# Patient Record
Sex: FEMALE | Race: WHITE | NOT HISPANIC OR LATINO | Employment: FULL TIME | URBAN - METROPOLITAN AREA
[De-identification: names, ages, dates, MRNs, and addresses within clinical notes are randomized per-mention and may not be internally consistent; named-entity substitution may affect disease eponyms.]

---

## 2019-06-25 ENCOUNTER — OFFICE VISIT CONVERTED (OUTPATIENT)
Dept: FAMILY MEDICINE CLINIC | Facility: CLINIC | Age: 44
End: 2019-06-25
Attending: NURSE PRACTITIONER

## 2019-06-25 ENCOUNTER — HOSPITAL ENCOUNTER (OUTPATIENT)
Dept: FAMILY MEDICINE CLINIC | Facility: CLINIC | Age: 44
Discharge: HOME | End: 2019-06-25
Attending: NURSE PRACTITIONER

## 2019-06-25 ENCOUNTER — HOSPITAL ENCOUNTER (OUTPATIENT)
Dept: GENERAL RADIOLOGY | Facility: HOSPITAL | Age: 44
Discharge: HOME | End: 2019-06-25
Attending: NURSE PRACTITIONER

## 2019-06-25 ENCOUNTER — HOSPITAL ENCOUNTER (OUTPATIENT)
Dept: LAB | Facility: HOSPITAL | Age: 44
Discharge: HOME | End: 2019-06-25
Attending: NURSE PRACTITIONER

## 2019-06-25 ENCOUNTER — CONVERSION ENCOUNTER (OUTPATIENT)
Dept: FAMILY MEDICINE CLINIC | Facility: CLINIC | Age: 44
End: 2019-06-25

## 2019-06-25 LAB
25(OH)D3 SERPL-MCNC: 39.3 NG/ML (ref 30–100)
ALBUMIN SERPL-MCNC: 4.1 G/DL (ref 3.5–5)
ALBUMIN/GLOB SERPL: 1.2 {RATIO} (ref 1.4–2.6)
ALP SERPL-CCNC: 70 U/L (ref 42–98)
ALT SERPL-CCNC: 13 U/L (ref 10–40)
AMYLASE SERPL-CCNC: 28 U/L (ref 30–110)
ANION GAP SERPL CALC-SCNC: 15 MMOL/L (ref 8–19)
AST SERPL-CCNC: 14 U/L (ref 15–50)
BASOPHILS # BLD AUTO: 0.05 10*3/UL (ref 0–0.2)
BASOPHILS NFR BLD AUTO: 0.6 % (ref 0–3)
BILIRUB SERPL-MCNC: 0.54 MG/DL (ref 0.2–1.3)
BUN SERPL-MCNC: 8 MG/DL (ref 5–25)
BUN/CREAT SERPL: 10 {RATIO} (ref 6–20)
CALCIUM SERPL-MCNC: 9 MG/DL (ref 8.7–10.4)
CHLORIDE SERPL-SCNC: 99 MMOL/L (ref 99–111)
CHOLEST SERPL-MCNC: 169 MG/DL (ref 107–200)
CHOLEST/HDLC SERPL: 3 {RATIO} (ref 3–6)
CONV ABS IMM GRAN: 0.02 10*3/UL (ref 0–0.2)
CONV CO2: 27 MMOL/L (ref 22–32)
CONV IMMATURE GRAN: 0.2 % (ref 0–1.8)
CONV TOTAL PROTEIN: 7.5 G/DL (ref 6.3–8.2)
CREAT UR-MCNC: 0.79 MG/DL (ref 0.5–0.9)
DEPRECATED RDW RBC AUTO: 43.8 FL (ref 36.4–46.3)
EOSINOPHIL # BLD AUTO: 0.03 10*3/UL (ref 0–0.7)
EOSINOPHIL # BLD AUTO: 0.4 % (ref 0–7)
ERYTHROCYTE [DISTWIDTH] IN BLOOD BY AUTOMATED COUNT: 12.4 % (ref 11.7–14.4)
EST. AVERAGE GLUCOSE BLD GHB EST-MCNC: 97 MG/DL
GFR SERPLBLD BASED ON 1.73 SQ M-ARVRAT: >60 ML/MIN/{1.73_M2}
GLOBULIN UR ELPH-MCNC: 3.4 G/DL (ref 2–3.5)
GLUCOSE SERPL-MCNC: 94 MG/DL (ref 65–99)
HBA1C MFR BLD: 13.5 G/DL (ref 12–16)
HBA1C MFR BLD: 5 % (ref 3.5–5.7)
HCT VFR BLD AUTO: 41.7 % (ref 37–47)
HDLC SERPL-MCNC: 57 MG/DL (ref 40–60)
LDLC SERPL CALC-MCNC: 79 MG/DL (ref 70–100)
LIPASE SERPL-CCNC: 44 U/L (ref 5–51)
LYMPHOCYTES # BLD AUTO: 2.2 10*3/UL (ref 1–5)
MCH RBC QN AUTO: 31.1 PG (ref 27–31)
MCHC RBC AUTO-ENTMCNC: 32.4 G/DL (ref 33–37)
MCV RBC AUTO: 96.1 FL (ref 81–99)
MONOCYTES # BLD AUTO: 0.65 10*3/UL (ref 0.2–1.2)
MONOCYTES NFR BLD AUTO: 8 % (ref 3–10)
NEUTROPHILS # BLD AUTO: 5.17 10*3/UL (ref 2–8)
NEUTROPHILS NFR BLD AUTO: 63.7 % (ref 30–85)
NRBC CBCN: 0 % (ref 0–0.7)
OSMOLALITY SERPL CALC.SUM OF ELEC: 282 MOSM/KG (ref 273–304)
PLATELET # BLD AUTO: 266 10*3/UL (ref 130–400)
PMV BLD AUTO: 10.8 FL (ref 9.4–12.3)
POTASSIUM SERPL-SCNC: 3.8 MMOL/L (ref 3.5–5.3)
RBC # BLD AUTO: 4.34 10*6/UL (ref 4.2–5.4)
SODIUM SERPL-SCNC: 137 MMOL/L (ref 135–147)
T4 FREE SERPL-MCNC: 1.2 NG/DL (ref 0.9–1.8)
TRIGL SERPL-MCNC: 164 MG/DL (ref 40–150)
TSH SERPL-ACNC: 2.69 M[IU]/L (ref 0.27–4.2)
VARIANT LYMPHS NFR BLD MANUAL: 27.1 % (ref 20–45)
VLDLC SERPL-MCNC: 33 MG/DL (ref 5–37)
WBC # BLD AUTO: 8.12 10*3/UL (ref 4.8–10.8)

## 2019-06-26 LAB
CONV HEPATITIS B SURFACE AG W CONFIRMATION RE: NEGATIVE
HAV IGM SERPL QL IA: NEGATIVE
HBV CORE IGM SERPL QL IA: NEGATIVE
HCV AB SER DONR QL: <0.1 S/CO RATIO (ref 0–0.9)

## 2019-06-27 LAB
BACTERIA SPEC AEROBE CULT: NORMAL
BACTERIA UR CULT: NORMAL

## 2019-07-10 ENCOUNTER — HOSPITAL ENCOUNTER (OUTPATIENT)
Dept: GENERAL RADIOLOGY | Facility: HOSPITAL | Age: 44
Discharge: HOME | End: 2019-07-10

## 2020-01-06 ENCOUNTER — HOSPITAL ENCOUNTER (OUTPATIENT)
Dept: URGENT CARE | Facility: CLINIC | Age: 45
Discharge: HOME | End: 2020-01-06

## 2020-04-28 ENCOUNTER — OFFICE VISIT CONVERTED (OUTPATIENT)
Dept: OTHER | Facility: HOSPITAL | Age: 45
End: 2020-04-28
Attending: PHYSICIAN ASSISTANT

## 2020-04-28 ENCOUNTER — HOSPITAL ENCOUNTER (OUTPATIENT)
Dept: OTHER | Facility: HOSPITAL | Age: 45
Discharge: HOME | End: 2020-04-28
Attending: PHYSICIAN ASSISTANT

## 2020-04-29 LAB — SARS-COV-2 RNA SPEC QL NAA+PROBE: NOT DETECTED

## 2020-05-08 ENCOUNTER — HOSPITAL ENCOUNTER (OUTPATIENT)
Dept: GENERAL RADIOLOGY | Facility: HOSPITAL | Age: 45
Discharge: HOME | End: 2020-05-08

## 2020-05-08 ENCOUNTER — OFFICE VISIT (OUTPATIENT)
Dept: FAMILY MEDICINE CLINIC | Facility: CLINIC | Age: 45
End: 2020-05-08

## 2020-05-08 VITALS
HEIGHT: 68 IN | WEIGHT: 175.8 LBS | HEART RATE: 88 BPM | BODY MASS INDEX: 26.64 KG/M2 | TEMPERATURE: 98.7 F | RESPIRATION RATE: 14 BRPM | SYSTOLIC BLOOD PRESSURE: 148 MMHG | DIASTOLIC BLOOD PRESSURE: 100 MMHG | OXYGEN SATURATION: 98 %

## 2020-05-08 DIAGNOSIS — R20.2 PARESTHESIA: Primary | ICD-10-CM

## 2020-05-08 DIAGNOSIS — M54.41 ACUTE RIGHT-SIDED LOW BACK PAIN WITH RIGHT-SIDED SCIATICA: ICD-10-CM

## 2020-05-08 PROBLEM — N93.9 ABNORMAL UTERINE AND VAGINAL BLEEDING, UNSPECIFIED: Status: ACTIVE | Noted: 2019-07-24

## 2020-05-08 PROCEDURE — 99203 OFFICE O/P NEW LOW 30 MIN: CPT | Performed by: NURSE PRACTITIONER

## 2020-05-08 RX ORDER — MELATONIN
1000 DAILY
COMMUNITY
End: 2022-07-11

## 2020-05-08 NOTE — PATIENT INSTRUCTIONS
I will call you with your lab results.   Please call with any questions or concerns.   Return in about 2 months (around 7/8/2020) for Annual, Labs.

## 2020-05-08 NOTE — PROGRESS NOTES
"Olga Pyle is a 44 y.o. female.   .     History of Present Illness   Patient here for numbness in right arm hand and right foot. Patient also complains of lower back pain.  Patients states having joint pain last week and the numbness followed she also states burning as well.  She states that her numbness is generally on the right side, but she does have minimal numbess in her left hand and leg. She does report an injury \"last year\" in cross-fit in her upper right shoulder. She states coldness in hand and foot. She take ibuprofen for her pain which she states gave some relief.     The following portions of the patient's history were reviewed and updated as appropriate: allergies, current medications, past family history, past medical history, past social history, past surgical history and problem list.    Review of Systems   Constitutional: Negative for chills, fatigue and fever.   Respiratory: Negative for cough and shortness of breath.    Cardiovascular: Negative for chest pain, palpitations and leg swelling.   Musculoskeletal: Positive for arthralgias, back pain, myalgias and neck stiffness. Negative for gait problem, joint swelling and neck pain.        Joint pain in hands bilaterally. Ankles bilaterally.    Skin: Negative for dry skin.   Neurological: Positive for numbness (Left arm and leg intermittently). Negative for dizziness, weakness and headache.        Tingling arm hand and leg and foot with burning   Psychiatric/Behavioral: Negative for sleep disturbance, suicidal ideas and depressed mood. The patient is nervous/anxious.        Objective   Physical Exam   Constitutional: She is oriented to person, place, and time. She appears well-developed and well-nourished.   HENT:   Head: Normocephalic and atraumatic.   Eyes: Pupils are equal, round, and reactive to light. Conjunctivae and EOM are normal.   Neck: Normal range of motion and full passive range of motion without pain. Neck supple. No " thyromegaly present.   Cardiovascular: Normal rate, regular rhythm, normal heart sounds and intact distal pulses.   No murmur heard.  Pulses:       Radial pulses are 2+ on the right side, and 2+ on the left side.        Dorsalis pedis pulses are 2+ on the right side, and 2+ on the left side.        Posterior tibial pulses are 1+ on the right side, and 1+ on the left side.   Pulmonary/Chest: Effort normal and breath sounds normal.   Musculoskeletal: Normal range of motion. She exhibits no edema or deformity.        Right shoulder: Normal. She exhibits normal range of motion, no tenderness, no bony tenderness, no swelling and no effusion.        Cervical back: Normal.        Thoracic back: Normal.        Lumbar back: Normal. She exhibits normal range of motion, no tenderness, no bony tenderness, no swelling and no edema.   Positive right leg raise with exam.    Lymphadenopathy:     She has no cervical adenopathy.   Neurological: She is alert and oriented to person, place, and time. No cranial nerve deficit. Coordination normal.   Skin: Skin is warm and dry.   Psychiatric: She has a normal mood and affect. Her behavior is normal. Judgment and thought content normal.   Nursing note and vitals reviewed.      Vitals:    05/08/20 0911   BP: 148/100   Pulse: 88   Resp: 14   Temp: 98.7 °F (37.1 °C)   SpO2: 98%     Body mass index is 26.73 kg/m².    Procedures    Assessment/Plan   Problems Addressed this Visit     None      Visit Diagnoses     Paresthesia    -  Primary    Relevant Orders    Comprehensive Metabolic Panel    [facility]    Rheumatoid Factor    Vitamin D 25 Hydroxy    Vitamin B12    Folate    TSH    CBC (No Diff)    Acute right-sided low back pain with right-sided sciatica        Relevant Orders    XR Spine Lumbar 2 or 3 View        Return in about 2 months (around 7/8/2020) for Annual, Labs.            Patient Instructions   I will call you with your lab results.   Please call with any questions or concerns.   Return  in about 2 months (around 7/8/2020) for Annual, Labs.

## 2020-05-09 LAB
25(OH)D3+25(OH)D2 SERPL-MCNC: 32.4 NG/ML (ref 30–100)
ALBUMIN SERPL-MCNC: 4.5 G/DL (ref 3.5–5.2)
ALBUMIN/GLOB SERPL: 1.5 G/DL
ALP SERPL-CCNC: 64 U/L (ref 39–117)
ALT SERPL-CCNC: 7 U/L (ref 1–33)
AST SERPL-CCNC: 8 U/L (ref 1–32)
BILIRUB SERPL-MCNC: 0.5 MG/DL (ref 0.2–1.2)
BUN SERPL-MCNC: 7 MG/DL (ref 6–20)
BUN/CREAT SERPL: 8.2 (ref 7–25)
CALCIUM SERPL-MCNC: 9.6 MG/DL (ref 8.6–10.5)
CHLORIDE SERPL-SCNC: 102 MMOL/L (ref 98–107)
CO2 SERPL-SCNC: 25.8 MMOL/L (ref 22–29)
CREAT SERPL-MCNC: 0.85 MG/DL (ref 0.57–1)
ERYTHROCYTE [DISTWIDTH] IN BLOOD BY AUTOMATED COUNT: 12 % (ref 12.3–15.4)
FOLATE SERPL-MCNC: 19 NG/ML (ref 4.78–24.2)
GLOBULIN SER CALC-MCNC: 3 GM/DL
GLUCOSE SERPL-MCNC: 119 MG/DL (ref 65–99)
HCT VFR BLD AUTO: 40.1 % (ref 34–46.6)
HGB BLD-MCNC: 13.9 G/DL (ref 12–15.9)
MCH RBC QN AUTO: 31.6 PG (ref 26.6–33)
MCHC RBC AUTO-ENTMCNC: 34.7 G/DL (ref 31.5–35.7)
MCV RBC AUTO: 91.1 FL (ref 79–97)
PLATELET # BLD AUTO: 281 10*3/MM3 (ref 140–450)
POTASSIUM SERPL-SCNC: 4.2 MMOL/L (ref 3.5–5.2)
PROT SERPL-MCNC: 7.5 G/DL (ref 6–8.5)
RBC # BLD AUTO: 4.4 10*6/MM3 (ref 3.77–5.28)
RHEUMATOID FACT SERPL-ACNC: <10 IU/ML (ref 0–13.9)
SODIUM SERPL-SCNC: 138 MMOL/L (ref 136–145)
TSH SERPL DL<=0.005 MIU/L-ACNC: 2.69 UIU/ML (ref 0.27–4.2)
VIT B12 SERPL-MCNC: 384 PG/ML (ref 211–946)
WBC # BLD AUTO: 6.9 10*3/MM3 (ref 3.4–10.8)

## 2020-05-11 LAB — ANA SER QL: NEGATIVE

## 2020-06-08 ENCOUNTER — OFFICE VISIT (OUTPATIENT)
Dept: FAMILY MEDICINE CLINIC | Facility: CLINIC | Age: 45
End: 2020-06-08

## 2020-06-08 VITALS
SYSTOLIC BLOOD PRESSURE: 130 MMHG | RESPIRATION RATE: 14 BRPM | OXYGEN SATURATION: 98 % | DIASTOLIC BLOOD PRESSURE: 80 MMHG | HEART RATE: 87 BPM | BODY MASS INDEX: 25.7 KG/M2 | WEIGHT: 169.6 LBS | TEMPERATURE: 98.6 F | HEIGHT: 68 IN

## 2020-06-08 DIAGNOSIS — M25.50 ARTHRALGIA, UNSPECIFIED JOINT: Primary | ICD-10-CM

## 2020-06-08 DIAGNOSIS — Z72.51 UNPROTECTED SEX: ICD-10-CM

## 2020-06-08 DIAGNOSIS — F41.9 ANXIETY: ICD-10-CM

## 2020-06-08 PROCEDURE — 99213 OFFICE O/P EST LOW 20 MIN: CPT | Performed by: NURSE PRACTITIONER

## 2020-06-08 NOTE — PATIENT INSTRUCTIONS
May take ibuprofen over the counter as directed for joint pain.  I will call you with your lab results.   Please call with any questions or concerns.

## 2020-06-08 NOTE — PROGRESS NOTES
"Olga Pyle is a 44 y.o. female.     History of Present Illness   Patient here for a follow up on all over joint pain that she states \"moves\" all over.  She states that when she takes ibuprofen that it helps. Pt was seen last month for this and all labs were normal.      Pt is also being seen for anxiety, ongoing.  She states that her anxiety has been \"worse\" with pandemic. Pt believes that her joint pain is related to her anxiety and states that \"she read this on google\".  Pt is not taking any medications for her anxiety. She does not wish to take any medications at this time or seek counseling.     Pt is also being seen for std screening. She states that she had unprotected sex in March 2019 and is concerned that she might have an STD, despite being checked in May of 2018 and all tests were negative. Patient denies any other sexual encounters since this time that were unprotected.     The following portions of the patient's history were reviewed and updated as appropriate: allergies, current medications, past family history, past medical history, past social history, past surgical history and problem list.    Review of Systems   Constitutional: Negative.  Negative for chills, fatigue and fever.   Respiratory: Negative.  Negative for cough, chest tightness, shortness of breath and wheezing.    Cardiovascular: Negative.  Negative for chest pain, palpitations and leg swelling.   Gastrointestinal: Negative.    Musculoskeletal: Positive for arthralgias. Negative for back pain, joint swelling and neck pain.        All over joint pain   Neurological: Positive for headache. Negative for dizziness.   Hematological: Negative.    Psychiatric/Behavioral: Negative for sleep disturbance. The patient is nervous/anxious.        Objective   Physical Exam   Constitutional: She is oriented to person, place, and time. She appears well-developed and well-nourished.   HENT:   Head: Normocephalic and atraumatic.   Eyes: " Pupils are equal, round, and reactive to light. Conjunctivae and EOM are normal.   Cardiovascular: Normal rate, regular rhythm, normal heart sounds and intact distal pulses.   No murmur heard.  Pulmonary/Chest: Effort normal and breath sounds normal.   Abdominal: Soft. Bowel sounds are normal. There is no tenderness.   Musculoskeletal: Normal range of motion. She exhibits no edema, tenderness or deformity.   Neurological: She is alert and oriented to person, place, and time.   Psychiatric: She has a normal mood and affect. Her behavior is normal. Judgment and thought content normal.   Nursing note and vitals reviewed.      Vitals:    06/08/20 1311   BP: 130/80   Pulse: 87   Resp: 14   Temp: 98.6 °F (37 °C)   SpO2: 98%     Body mass index is 25.79 kg/m².    Procedures    Assessment/Plan   Problems Addressed this Visit     None      Visit Diagnoses     Arthralgia, unspecified joint    -  Primary  Ibuprofen OTC as directed    Unprotected sex        Relevant Orders    HIV-1 / O / 2 Ag / Antibody 4th Generation        Anxiety  Recommended SSRI, Lexapro specifically. Pt refused.         Return if symptoms worsen or fail to improve.       Answers for HPI/ROS submitted by the patient on 6/6/2020   Neurologic complaint  What is the primary reason for your visit?: Neurological Problem    Patient Instructions   May take ibuprofen over the counter as directed for joint pain.  I will call you with your lab results.   Please call with any questions or concerns.

## 2020-06-09 LAB — HIV 1+2 AB+HIV1 P24 AG SERPL QL IA: NON REACTIVE

## 2020-08-06 DIAGNOSIS — M54.41 ACUTE RIGHT-SIDED LOW BACK PAIN WITH RIGHT-SIDED SCIATICA: ICD-10-CM

## 2021-05-10 NOTE — H&P
History and Physical      Patient Name: Britany Pyle   Patient ID: [id number]   Sex: Female   YOB: 1975        Visit Date: 2020    Provider: Blanca Wilder PA-C   [facility]   Location Address: [address] [place] [address]  [id number]   Location Phone: [phone]          Chief Complaint  · Evaluation for Respiratory Virus      History Of Present Illness  Britany Pyle is a 44 year old /White female who presents today to the clinic for evaluation of a respiratory virus.   Date of Onset: 2020   What Symptoms: chills, diarrhea, muscle ache, and shortness of breath   Quality of Symptoms: Patient has had symptoms since Saturday with body aches diarrhea shortness of breath lightheadedness dizziness tingling in right hand and chills. She denies fever she denies cough. 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   Anything make it worse or better: Nothing has helped. Patient was told by hotline to come here for COVID -19   Severity of Symptoms: moderately bothersome   Any known Exposure to COVID-19: no known exposure       Past Medical History  Broken Bones; Migraine Headaches         Past Surgical History  *Denies any surgical procedures         Allergy List  NO KNOWN DRUG ALLERGIES       Allergies Reconciled  Family Medical History  *Non Contributory         Reproductive History   3 Para 3 0 0 0       Social History  Tobacco (Never)         Immunizations  Name Date Admin   Tdap          Review of Systems  · Constitutional  o Admits  o : chills , body aches  o Denies  o : fatigue, fever, weight gain, weight loss  · Respiratory  o Admits  o : shortness of breath  o Denies  o : cough, asthma  · Gastrointestinal  o Admits  o : diarrhea  o Denies  o : nausea, vomiting, constipation, abdominal pain  · Integument  o Denies  o : rash  · Neurologic  o Denies  o :  headache      Vitals  Date Time BP Position Site L\R Cuff Size HR RR TEMP (F) WT  HT  BMI kg/m2 BSA m2 O2 Sat        04/28/2020 08:55 AM      115 - R  98.5     95 %          Physical Examination  · Constitutional  o Appearance  o : no acute distress, well-nourished  · Head and Face  o Head  o :   § Inspection  § : atraumatic, normocephalic  · Eyes  o Eyes  o : extraocular movements intact, no scleral icterus, no conjunctival injection  · Ears, Nose, Mouth and Throat  o Ears  o :   § External Ears  § : normal  § Otoscopic Examination  § : normal tympanic membrane exam  o Nose  o :   § Intranasal Exam  § : sinuses nontender to palpation  o Oral Cavity  o :   § Oral Mucosa  § : moist mucous membranes  o Throat  o :   § Oropharynx  § : normal tonsils, normal oropharynx  · Respiratory  o Respiratory Effort  o : breathing comfortably, symmetric chest rise  o Auscultation of Lungs  o : clear to asculatation bilaterally, no wheezes, rales, or rhonchii  · Cardiovascular  o Heart  o :   § Auscultation of Heart  § : regular rate and rhythm, no murmurs, rubs, or gallops  o Peripheral Vascular System  o :   § Extremities  § : no edema  · Lymphatic  o Neck  o : no lymphadenopathy present  o Supraclavicular Nodes  o : no supraclavicular nodes  · Skin and Subcutaneous Tissue  o General Inspection  o : normal inspection  · Neurologic  o Mental Status Examination  o :   § Orientation  § : grossly oriented to person, place and time  o Gait and Station  o :   § Gait Screening  § : normal gait  · Psychiatric  o General  o : normal mood and affect          Results  · In-Office Procedures  o Lab procedure  § Rapid group A Streptococcus screen (31874)   § Beta Strep Gp A Culture: Negative   § Internal Control Verified?: Yes       Assessment  · Body aches     780.96/R52  · Chills     780.64/R68.83  · Diarrhea     787.91/R19.7  · Shortness of breath     786.05/R06.02      Plan  · Orders  o [facility] NCOV2 (send-out) (22939) -  780.96/R52, 780.64/R68.83, 787.91/R19.7, 786.05/R06.02 - 04/28/2020  · Medications  o Medications have been Reconciled  o Transition of Care or Provider Policy  · Instructions  o Plan of Care:   o Patient instructed to seek medical attention urgently for new or worsening symptoms.  o Patient was educated on their diagnosis, treatment, and medications today.   o Recommend self monitoring. Instructions given.   o Stay home until without fever for 72 hours without using fever-reducing medications and other symptoms are gone.  o Recommends over the counter medications for symptom management.  o Patient was swabbed for COVID-19. Patient was sent home with COVID-19 handout information. Patient was informed to self isolate. Patient was given a 3 day work note given the time it takes for lab results to return and for patient to be notified. Further days off if required are explained in COVID test handout given to patient. Patient will be notified of test results. Patient was encouraged to stay hydrated. Patient was educated to use Tylenol if able, as directed. If not, patient is instructed to use fever reducing OTC meds as directed. Patient can use OTC medications as directed for symptoms.   o Strep was negative  · Disposition  o Call or Return if symptoms worsen or persist.            Electronically Signed by: Blanca Wilder PA-C -Author on April 28, 2020 09:30:48 AM

## 2021-05-15 VITALS — OXYGEN SATURATION: 95 % | TEMPERATURE: 98.5 F | HEART RATE: 115 BPM

## 2021-05-15 VITALS
OXYGEN SATURATION: 98 % | TEMPERATURE: 97.9 F | DIASTOLIC BLOOD PRESSURE: 80 MMHG | HEIGHT: 68 IN | RESPIRATION RATE: 26 BRPM | HEART RATE: 74 BPM | BODY MASS INDEX: 26 KG/M2 | SYSTOLIC BLOOD PRESSURE: 131 MMHG | WEIGHT: 171.56 LBS

## 2021-09-10 ENCOUNTER — LAB (OUTPATIENT)
Dept: LAB | Facility: HOSPITAL | Age: 46
End: 2021-09-10

## 2021-09-10 ENCOUNTER — OFFICE VISIT (OUTPATIENT)
Dept: FAMILY MEDICINE CLINIC | Facility: CLINIC | Age: 46
End: 2021-09-10

## 2021-09-10 VITALS
WEIGHT: 176.4 LBS | TEMPERATURE: 97.7 F | HEIGHT: 68 IN | BODY MASS INDEX: 26.73 KG/M2 | OXYGEN SATURATION: 99 % | DIASTOLIC BLOOD PRESSURE: 92 MMHG | HEART RATE: 102 BPM | SYSTOLIC BLOOD PRESSURE: 147 MMHG

## 2021-09-10 DIAGNOSIS — Z00.00 ANNUAL PHYSICAL EXAM: ICD-10-CM

## 2021-09-10 DIAGNOSIS — Z32.00 POSSIBLE PREGNANCY: Primary | ICD-10-CM

## 2021-09-10 LAB
B-HCG UR QL: NEGATIVE
INTERNAL NEGATIVE CONTROL: NORMAL
INTERNAL POSITIVE CONTROL: NORMAL
Lab: NORMAL

## 2021-09-10 PROCEDURE — 99203 OFFICE O/P NEW LOW 30 MIN: CPT | Performed by: FAMILY MEDICINE

## 2021-09-10 PROCEDURE — 81025 URINE PREGNANCY TEST: CPT | Performed by: FAMILY MEDICINE

## 2021-09-10 RX ORDER — ETONOGESTREL AND ETHINYL ESTRADIOL 11.7; 2.7 MG/1; MG/1
1 INSERT, EXTENDED RELEASE VAGINAL
COMMUNITY
Start: 2021-09-07 | End: 2023-02-03

## 2021-09-10 NOTE — PROGRESS NOTES
Chief Complaint   Patient presents with   • Establish Care        Subjective     Britany Pyle  has a past medical history of Anxiety, Broken bones (2009), and Migraine headache.    Establish care-she states the last couple years she is bounced around and not had a dedicated full-time PCP.  She otherwise is a rather healthy young lady.    Elevated blood pressure-she has once before her blood pressure was elevated but then it self resolved.  Currently she has some personal stress that may have it elevated.      PHQ-2 Depression Screening  Little interest or pleasure in doing things? 0   Feeling down, depressed, or hopeless? 0   PHQ-2 Total Score 0   PHQ-9 Depression Screening  Little interest or pleasure in doing things? 0   Feeling down, depressed, or hopeless? 0   Trouble falling or staying asleep, or sleeping too much?     Feeling tired or having little energy?     Poor appetite or overeating?     Feeling bad about yourself - or that you are a failure or have let yourself or your family down?     Trouble concentrating on things, such as reading the newspaper or watching television?     Moving or speaking so slowly that other people could have noticed? Or the opposite - being so fidgety or restless that you have been moving around a lot more than usual?     Thoughts that you would be better off dead, or of hurting yourself in some way?     PHQ-9 Total Score 0   If you checked off any problems, how difficult have these problems made it for you to do your work, take care of things at home, or get along with other people?       No Known Allergies    Prior to Admission medications    Medication Sig Start Date End Date Taking? Authorizing Provider   cholecalciferol (VITAMIN D3) 25 MCG (1000 UT) tablet Take 1,000 Units by mouth Daily.   Yes Provider, MD TARIK Johnson PO Take  by mouth.   Yes Provider, MD Elizabeth   etonogestrel-ethinyl estradiol (NUVARING) 0.12-0.015 MG/24HR vaginal ring Insert 1 each  "into the vagina. 9/7/21 9/7/22 Yes ProviderElizabeth MD   Multiple Vitamins-Minerals (WOMENS MULTIVITAMIN PO) Take  by mouth.   Yes Provider, MD Elizabeth        Patient Active Problem List   Diagnosis   • Abnormal uterine and vaginal bleeding, unspecified   • Uterovaginal prolapse, incomplete   • Annual physical exam   • Possible pregnancy        Past Surgical History:   Procedure Laterality Date   • LEG SURGERY      right leg       Social History     Socioeconomic History   • Marital status:      Spouse name: Not on file   • Number of children: Not on file   • Years of education: Not on file   • Highest education level: Not on file   Tobacco Use   • Smoking status: Never Smoker   • Smokeless tobacco: Never Used   Vaping Use   • Vaping Use: Never used   Substance and Sexual Activity   • Alcohol use: Yes     Comment: occasionally   • Drug use: Never   • Sexual activity: Not Currently       Family History   Problem Relation Age of Onset   • Hypertension Mother        Family history, surgical history, past medical history, Allergies and med's reviewed with patient today and updated in [employer] EMR.     ROS:  Review of Systems   Constitutional: Negative for appetite change, chills and fatigue.   HENT: Negative for congestion.    Eyes: Negative for blurred vision and visual disturbance.   Respiratory: Negative for cough, chest tightness, shortness of breath and wheezing.    Cardiovascular: Negative for chest pain and palpitations.   Gastrointestinal: Negative for abdominal pain, constipation and diarrhea.   Neurological: Positive for headache.   Psychiatric/Behavioral: Negative for depressed mood. The patient is not nervous/anxious.        OBJECTIVE:  Vitals:    09/10/21 1423   BP: 147/92   BP Location: Right arm   Patient Position: Sitting   Pulse: 102   Temp: 97.7 °F (36.5 °C)   SpO2: 99%   Weight: 80 kg (176 lb 6.4 oz)   Height: 172.7 cm (68\")     No exam data present   Body mass index is 26.82 kg/m².  No " LMP recorded.    Physical Exam  Vitals and nursing note reviewed.   Constitutional:       General: She is not in acute distress.     Appearance: Normal appearance. She is normal weight.   HENT:      Head: Normocephalic.      Right Ear: Tympanic membrane, ear canal and external ear normal.      Left Ear: Tympanic membrane, ear canal and external ear normal.      Nose: Nose normal.      Mouth/Throat:      Mouth: Mucous membranes are moist.      Pharynx: Oropharynx is clear.   Eyes:      General: No scleral icterus.     Conjunctiva/sclera: Conjunctivae normal.      Pupils: Pupils are equal, round, and reactive to light.   Cardiovascular:      Rate and Rhythm: Normal rate and regular rhythm.      Pulses: Normal pulses.      Heart sounds: Normal heart sounds. No murmur heard.     Pulmonary:      Effort: Pulmonary effort is normal.      Breath sounds: Normal breath sounds. No wheezing, rhonchi or rales.   Abdominal:      General: Abdomen is flat. Bowel sounds are normal.      Palpations: Abdomen is soft. There is no mass.      Tenderness: There is no abdominal tenderness. There is no right CVA tenderness.   Musculoskeletal:      Cervical back: No rigidity or tenderness.   Lymphadenopathy:      Cervical: No cervical adenopathy.   Skin:     General: Skin is warm and dry.      Coloration: Skin is not jaundiced.      Findings: No rash.   Neurological:      General: No focal deficit present.      Mental Status: She is alert and oriented to person, place, and time.      Gait: Gait normal.   Psychiatric:         Mood and Affect: Mood normal.         Thought Content: Thought content normal.         Judgment: Judgment normal.         Procedures    No visits with results within 30 Day(s) from this visit.   Latest known visit with results is:   Office Visit on 06/08/2020   Component Date Value Ref Range Status   • HIV Screen 4th Gen w/RFX (Referenc* 06/08/2020 Non Reactive  Non Reactive Final       ASSESSMENT/ PLAN:    Diagnoses and  all orders for this visit:    1. Possible pregnancy (Primary)  Assessment & Plan:  We will check a urine pregnancy test to rule this out.    Orders:  -     POCT pregnancy, urine    2. Annual physical exam  Assessment & Plan:  She is an otherwise healthy young lady.  We will discuss some routine labs.    Orders:  -     Comprehensive Metabolic Panel  -     CBC & Differential  -     TSH  -     Lipid Panel      Orders Placed Today:     No orders of the defined types were placed in this encounter.       Management Plan:     An After Visit Summary was printed and given to the patient at discharge.    Follow-up: No follow-ups on file.    Leobardo Trammell DO 9/10/2021 14:53 EDT  This note was electronically signed.

## 2021-09-11 LAB
ALBUMIN SERPL-MCNC: 4.2 G/DL (ref 3.5–5.2)
ALBUMIN/GLOB SERPL: 1.3 G/DL
ALP SERPL-CCNC: 65 U/L (ref 39–117)
ALT SERPL W P-5'-P-CCNC: 14 U/L (ref 1–33)
ANION GAP SERPL CALCULATED.3IONS-SCNC: 11.6 MMOL/L (ref 5–15)
AST SERPL-CCNC: 15 U/L (ref 1–32)
BASOPHILS # BLD AUTO: 0.07 10*3/MM3 (ref 0–0.2)
BASOPHILS NFR BLD AUTO: 0.8 % (ref 0–1.5)
BILIRUB SERPL-MCNC: 0.2 MG/DL (ref 0–1.2)
BUN SERPL-MCNC: 8 MG/DL (ref 6–20)
BUN/CREAT SERPL: 10 (ref 7–25)
CALCIUM SPEC-SCNC: 9.4 MG/DL (ref 8.6–10.5)
CHLORIDE SERPL-SCNC: 108 MMOL/L (ref 98–107)
CHOLEST SERPL-MCNC: 137 MG/DL (ref 0–200)
CO2 SERPL-SCNC: 21.4 MMOL/L (ref 22–29)
CREAT SERPL-MCNC: 0.8 MG/DL (ref 0.57–1)
DEPRECATED RDW RBC AUTO: 39.4 FL (ref 37–54)
EOSINOPHIL # BLD AUTO: 0.12 10*3/MM3 (ref 0–0.4)
EOSINOPHIL NFR BLD AUTO: 1.4 % (ref 0.3–6.2)
ERYTHROCYTE [DISTWIDTH] IN BLOOD BY AUTOMATED COUNT: 11.9 % (ref 12.3–15.4)
GFR SERPL CREATININE-BSD FRML MDRD: 78 ML/MIN/1.73
GLOBULIN UR ELPH-MCNC: 3.2 GM/DL
GLUCOSE SERPL-MCNC: 117 MG/DL (ref 65–99)
HCT VFR BLD AUTO: 39 % (ref 34–46.6)
HDLC SERPL-MCNC: 29 MG/DL (ref 40–60)
HGB BLD-MCNC: 13.1 G/DL (ref 12–15.9)
IMM GRANULOCYTES # BLD AUTO: 0.03 10*3/MM3 (ref 0–0.05)
IMM GRANULOCYTES NFR BLD AUTO: 0.4 % (ref 0–0.5)
LDLC SERPL CALC-MCNC: 90 MG/DL (ref 0–100)
LDLC/HDLC SERPL: 3.06 {RATIO}
LYMPHOCYTES # BLD AUTO: 3.25 10*3/MM3 (ref 0.7–3.1)
LYMPHOCYTES NFR BLD AUTO: 38 % (ref 19.6–45.3)
MCH RBC QN AUTO: 30.4 PG (ref 26.6–33)
MCHC RBC AUTO-ENTMCNC: 33.6 G/DL (ref 31.5–35.7)
MCV RBC AUTO: 90.5 FL (ref 79–97)
MONOCYTES # BLD AUTO: 0.65 10*3/MM3 (ref 0.1–0.9)
MONOCYTES NFR BLD AUTO: 7.6 % (ref 5–12)
NEUTROPHILS NFR BLD AUTO: 4.44 10*3/MM3 (ref 1.7–7)
NEUTROPHILS NFR BLD AUTO: 51.8 % (ref 42.7–76)
NRBC BLD AUTO-RTO: 0 /100 WBC (ref 0–0.2)
PLATELET # BLD AUTO: 346 10*3/MM3 (ref 140–450)
PMV BLD AUTO: 10.6 FL (ref 6–12)
POTASSIUM SERPL-SCNC: 3.9 MMOL/L (ref 3.5–5.2)
PROT SERPL-MCNC: 7.4 G/DL (ref 6–8.5)
RBC # BLD AUTO: 4.31 10*6/MM3 (ref 3.77–5.28)
SODIUM SERPL-SCNC: 141 MMOL/L (ref 136–145)
TRIGL SERPL-MCNC: 97 MG/DL (ref 0–150)
TSH SERPL DL<=0.05 MIU/L-ACNC: 2.31 UIU/ML (ref 0.27–4.2)
VLDLC SERPL-MCNC: 18 MG/DL (ref 5–40)
WBC # BLD AUTO: 8.56 10*3/MM3 (ref 3.4–10.8)

## 2021-09-11 PROCEDURE — 80053 COMPREHEN METABOLIC PANEL: CPT | Performed by: FAMILY MEDICINE

## 2021-09-11 PROCEDURE — 84443 ASSAY THYROID STIM HORMONE: CPT | Performed by: FAMILY MEDICINE

## 2021-09-11 PROCEDURE — 85025 COMPLETE CBC W/AUTO DIFF WBC: CPT | Performed by: FAMILY MEDICINE

## 2021-09-11 PROCEDURE — 80061 LIPID PANEL: CPT | Performed by: FAMILY MEDICINE

## 2021-12-03 ENCOUNTER — OFFICE VISIT (OUTPATIENT)
Dept: FAMILY MEDICINE CLINIC | Facility: CLINIC | Age: 46
End: 2021-12-03

## 2021-12-03 VITALS
OXYGEN SATURATION: 98 % | BODY MASS INDEX: 26.98 KG/M2 | TEMPERATURE: 98.9 F | DIASTOLIC BLOOD PRESSURE: 113 MMHG | WEIGHT: 178 LBS | SYSTOLIC BLOOD PRESSURE: 165 MMHG | HEIGHT: 68 IN | HEART RATE: 90 BPM

## 2021-12-03 DIAGNOSIS — N64.4 BREAST PAIN, LEFT: ICD-10-CM

## 2021-12-03 DIAGNOSIS — H69.83 EUSTACHIAN TUBE DYSFUNCTION, BILATERAL: Primary | ICD-10-CM

## 2021-12-03 DIAGNOSIS — R03.0 ELEVATED BLOOD PRESSURE READING: ICD-10-CM

## 2021-12-03 PROCEDURE — 99213 OFFICE O/P EST LOW 20 MIN: CPT | Performed by: NURSE PRACTITIONER

## 2021-12-03 RX ORDER — FLUTICASONE PROPIONATE 50 MCG
2 SPRAY, SUSPENSION (ML) NASAL DAILY
Qty: 16 G | Refills: 5 | Status: SHIPPED | OUTPATIENT
Start: 2021-12-03

## 2021-12-03 RX ORDER — METHYLPREDNISOLONE 4 MG/1
TABLET ORAL
Qty: 1 EACH | Refills: 0 | Status: SHIPPED | OUTPATIENT
Start: 2021-12-03 | End: 2022-02-03

## 2021-12-03 NOTE — PROGRESS NOTES
Chief Complaint  Earache (fluid on ears bilateral. )    Subjective          Britany Pyle is a 45 y.o. female who presents to [facility]    History of Present Illness    Bilateral otalgia. Seen [facility] and took mucinex x 14 days and cleared.   Return of pain.     Left breast pain, onset this week. Pt started working out. Due for mammo.        PHQ-2 Total Score:     PHQ-9 Total Score:         Review of Systems   Constitutional: Negative for chills, fatigue and fever.   Respiratory: Negative for cough and shortness of breath.    Cardiovascular: Negative for chest pain and palpitations.   Gastrointestinal: Negative for constipation, diarrhea, nausea and vomiting.   Musculoskeletal: Negative for back pain and neck pain.   Skin: Negative for rash.   Neurological: Positive for headaches. Negative for dizziness.          Medical History: has a past medical history of Anxiety, Broken bones (2009), and Migraine headache.     Surgical History: has a past surgical history that includes Leg Surgery.     Family History: family history includes Hypertension in her mother.     Social History: reports that she has never smoked. She has never used smokeless tobacco. She reports current alcohol use. She reports that she does not use drugs.    Allergies: Patient has no known allergies.      Health Maintenance Due   Topic Date Due   • ANNUAL PHYSICAL  Never done   • COVID-19 Vaccine (1) Never done            Current Outpatient Medications:   •  cholecalciferol (VITAMIN D3) 25 MCG (1000 UT) tablet, Take 1,000 Units by mouth Daily., Disp: , Rfl:   •  ELDERBERRY PO, Take  by mouth., Disp: , Rfl:   •  etonogestrel-ethinyl estradiol (NUVARING) 0.12-0.015 MG/24HR vaginal ring, Insert 1 each into the vagina., Disp: , Rfl:   •  Multiple Vitamins-Minerals (WOMENS MULTIVITAMIN PO), Take  by mouth., Disp: , Rfl:   •  fluticasone (Flonase) 50 MCG/ACT nasal spray, 2 sprays into the nostril(s) as  "directed by provider Daily., Disp: 16 g, Rfl: 5  •  methylPREDNISolone (MEDROL) 4 MG dose pack, Take as directed on package instructions., Disp: 1 each, Rfl: 0      Immunization History   Administered Date(s) Administered   • Tdap 06/25/2019         Objective       Vitals:    12/03/21 0942   BP: 149/97   BP Location: Right arm   Patient Position: Sitting   Cuff Size: Adult   Pulse: 90   Temp: 98.9 °F (37.2 °C)   TempSrc: Temporal   SpO2: 98%   Weight: 80.7 kg (178 lb)   Height: 172.7 cm (67.99\")      Body mass index is 27.07 kg/m².   Wt Readings from Last 3 Encounters:   12/03/21 80.7 kg (178 lb)   09/10/21 80 kg (176 lb 6.4 oz)   06/08/20 76.9 kg (169 lb 9.6 oz)      BP Readings from Last 3 Encounters:   12/03/21 149/97   09/10/21 147/92   06/08/20 130/80        Physical Exam  Vitals reviewed.   Constitutional:       Appearance: Normal appearance. She is well-developed.   HENT:      Head: Normocephalic and atraumatic.      Right Ear: A middle ear effusion is present.      Left Ear: A middle ear effusion is present.      Ears:      Comments: Clear fluid greater right than left.   Eyes:      Conjunctiva/sclera: Conjunctivae normal.      Pupils: Pupils are equal, round, and reactive to light.   Cardiovascular:      Rate and Rhythm: Normal rate and regular rhythm.      Heart sounds: Normal heart sounds. No murmur heard.      Pulmonary:      Effort: Pulmonary effort is normal.      Breath sounds: Normal breath sounds. No wheezing or rhonchi.   Chest:   Breasts:      Right: No inverted nipple, mass or nipple discharge.      Left: Tenderness present. No inverted nipple, mass or nipple discharge.            Comments: Left lower outter quad  Abdominal:      General: Bowel sounds are normal. There is no distension.      Palpations: Abdomen is soft.      Tenderness: There is no abdominal tenderness.   Skin:     General: Skin is warm and dry.   Neurological:      Mental Status: She is alert and oriented to person, place, and " time.   Psychiatric:         Mood and Affect: Mood and affect normal.         Behavior: Behavior normal.         Thought Content: Thought content normal.         Judgment: Judgment normal.             Result Review :              Assessment and Plan        Diagnoses and all orders for this visit:    1. Eustachian tube dysfunction, bilateral (Primary)  -     methylPREDNISolone (MEDROL) 4 MG dose pack; Take as directed on package instructions.  Dispense: 1 each; Refill: 0  -     fluticasone (Flonase) 50 MCG/ACT nasal spray; 2 sprays into the nostril(s) as directed by provider Daily.  Dispense: 16 g; Refill: 5    2. Breast pain, left  -     Mammo Diagnostic Digital Tomosynthesis Bilateral With CAD; Future    3. Elevated blood pressure reading  Comments:  Monitor blood pressure reading if above 140/90, follow up.      Follow Up     No follow-ups on file.    Patient was given instructions and counseling regarding her condition or for health maintenance advice. Please see specific information pulled into the AVS if appropriate.     [employer]

## 2021-12-17 ENCOUNTER — TELEPHONE (OUTPATIENT)
Dept: FAMILY MEDICINE CLINIC | Facility: CLINIC | Age: 46
End: 2021-12-17

## 2021-12-17 ENCOUNTER — HOSPITAL ENCOUNTER (OUTPATIENT)
Dept: MAMMOGRAPHY | Facility: HOSPITAL | Age: 46
Discharge: HOME | End: 2021-12-17

## 2021-12-17 ENCOUNTER — HOSPITAL ENCOUNTER (OUTPATIENT)
Dept: ULTRASOUND IMAGING | Facility: HOSPITAL | Age: 46
Discharge: HOME | End: 2021-12-17

## 2021-12-17 DIAGNOSIS — N64.4 BREAST PAIN, LEFT: ICD-10-CM

## 2021-12-17 PROCEDURE — 76642 ULTRASOUND BREAST LIMITED: CPT

## 2021-12-17 PROCEDURE — G0279 TOMOSYNTHESIS, MAMMO: HCPCS

## 2021-12-17 PROCEDURE — 77066 DX MAMMO INCL CAD BI: CPT

## 2022-02-03 ENCOUNTER — OFFICE VISIT (OUTPATIENT)
Dept: FAMILY MEDICINE CLINIC | Facility: CLINIC | Age: 47
End: 2022-02-03

## 2022-02-03 VITALS
TEMPERATURE: 97.9 F | DIASTOLIC BLOOD PRESSURE: 86 MMHG | WEIGHT: 178 LBS | BODY MASS INDEX: 26.98 KG/M2 | HEART RATE: 108 BPM | SYSTOLIC BLOOD PRESSURE: 135 MMHG | HEIGHT: 68 IN | OXYGEN SATURATION: 97 %

## 2022-02-03 DIAGNOSIS — U07.1 COVID-19 VIRUS INFECTION: Primary | ICD-10-CM

## 2022-02-03 DIAGNOSIS — R79.89 POSITIVE D DIMER: Primary | ICD-10-CM

## 2022-02-03 PROBLEM — T14.8XXA BROKEN BONES: Status: ACTIVE | Noted: 2022-02-03

## 2022-02-03 PROBLEM — G43.909 MIGRAINE: Status: ACTIVE | Noted: 2022-02-03

## 2022-02-03 LAB — D DIMER PPP FEU-MCNC: 0.73 MG/L (FEU) (ref 0–0.59)

## 2022-02-03 PROCEDURE — 36415 COLL VENOUS BLD VENIPUNCTURE: CPT | Performed by: FAMILY MEDICINE

## 2022-02-03 PROCEDURE — 85379 FIBRIN DEGRADATION QUANT: CPT | Performed by: FAMILY MEDICINE

## 2022-02-03 PROCEDURE — 99213 OFFICE O/P EST LOW 20 MIN: CPT | Performed by: FAMILY MEDICINE

## 2022-02-03 RX ORDER — DEXAMETHASONE 6 MG/1
6 TABLET ORAL
Qty: 7 TABLET | Refills: 0 | Status: SHIPPED | OUTPATIENT
Start: 2022-02-03 | End: 2022-02-10

## 2022-02-03 RX ORDER — ALBUTEROL SULFATE 90 UG/1
2 AEROSOL, METERED RESPIRATORY (INHALATION) EVERY 6 HOURS PRN
Qty: 18 G | Refills: 0 | Status: SHIPPED | OUTPATIENT
Start: 2022-02-03 | End: 2022-07-11

## 2022-02-03 NOTE — ASSESSMENT & PLAN NOTE
Her symptoms were initially improved but she has since developed a cough and shortness of breath.  We will get a chest x-ray to rule out Covid pneumonia.  We will also check a D-dimer to rule out pulmonary embolism

## 2022-02-03 NOTE — PROGRESS NOTES
Chief Complaint   Patient presents with   • Cough     Present for 4 days- Covid positive 01/22/22        Subjective     Britany Pyle  has a past medical history of Broken bones (2009) and Migraine headache.    COVID-19-she states she had COVID-19 in her household and subsequently did a home Covid test on 22 January.  This was positive.  She states she initially had some body aches fever and a headache.  She simply felt well after 24 hours but subsequently has developed a cough.  She denies any wheezing but has some shortness of breath with some minimal activity.      PHQ-2 Depression Screening  Little interest or pleasure in doing things?     Feeling down, depressed, or hopeless?     PHQ-2 Total Score     PHQ-9 Depression Screening  Little interest or pleasure in doing things?     Feeling down, depressed, or hopeless?     Trouble falling or staying asleep, or sleeping too much?     Feeling tired or having little energy?     Poor appetite or overeating?     Feeling bad about yourself - or that you are a failure or have let yourself or your family down?     Trouble concentrating on things, such as reading the newspaper or watching television?     Moving or speaking so slowly that other people could have noticed? Or the opposite - being so fidgety or restless that you have been moving around a lot more than usual?     Thoughts that you would be better off dead, or of hurting yourself in some way?     PHQ-9 Total Score     If you checked off any problems, how difficult have these problems made it for you to do your work, take care of things at home, or get along with other people?       No Known Allergies    Prior to Admission medications    Medication Sig Start Date End Date Taking? Authorizing Provider   cholecalciferol (VITAMIN D3) 25 MCG (1000 UT) tablet Take 1,000 Units by mouth Daily.   Yes ProviderElizabeth MD ELDERBERRY PO Take  by mouth.   Yes ProviderElizabeth MD   etonogestrel-ethinyl estradiol  (NUVARING) 0.12-0.015 MG/24HR vaginal ring Insert 1 each into the vagina. 9/7/21 9/7/22 Yes Provider, MD Elizabeth   fluticasone (Flonase) 50 MCG/ACT nasal spray 2 sprays into the nostril(s) as directed by provider Daily. 12/3/21  Yes Nery Baires APRN   Multiple Vitamins-Minerals (WOMENS MULTIVITAMIN PO) Take  by mouth.   Yes Provider, MD Elizabeth   methylPREDNISolone (MEDROL) 4 MG dose pack Take as directed on package instructions. 12/3/21 2/3/22  Nery Baires APRN        Patient Active Problem List   Diagnosis   • Abnormal uterine and vaginal bleeding, unspecified   • Uterovaginal prolapse, incomplete   • Annual physical exam   • Possible pregnancy   • Broken bones   • Migraine   • COVID-19 virus infection        Past Surgical History:   Procedure Laterality Date   • LEG SURGERY      right leg       Social History     Socioeconomic History   • Marital status:    Tobacco Use   • Smoking status: Never Smoker   • Smokeless tobacco: Never Used   Vaping Use   • Vaping Use: Never used   Substance and Sexual Activity   • Alcohol use: Yes     Comment: occasionally   • Drug use: Never   • Sexual activity: Not Currently       Family History   Problem Relation Age of Onset   • Hypertension Mother        Family history, surgical history, past medical history, Allergies and med's reviewed with patient today and updated in [facility]MR.     ROS:  Review of Systems   Constitutional: Positive for fatigue. Negative for chills and fever.   HENT: Positive for congestion and postnasal drip. Negative for rhinorrhea.    Respiratory: Positive for cough and shortness of breath. Negative for chest tightness and wheezing.    Cardiovascular: Negative for chest pain and palpitations.   Gastrointestinal: Positive for diarrhea. Negative for constipation.   Neurological: Negative for headache.       OBJECTIVE:  Vitals:    02/03/22 0813 02/03/22 0815   BP: (!) 145/101 135/86   Pulse: 108    Temp: 97.9 °F (36.6 °C)    TempSrc:  "Temporal    SpO2: 97%    Weight: 80.7 kg (178 lb)    Height: 172.7 cm (68\")      No exam data present   Body mass index is 27.06 kg/m².  No LMP recorded.    Physical Exam  Vitals and nursing note reviewed.   Constitutional:       General: She is not in acute distress.     Appearance: Normal appearance. She is normal weight.   HENT:      Head: Normocephalic.      Right Ear: Tympanic membrane, ear canal and external ear normal.      Left Ear: Tympanic membrane, ear canal and external ear normal.      Nose: Nose normal.      Mouth/Throat:      Mouth: Mucous membranes are moist.      Pharynx: Oropharynx is clear.   Eyes:      General: No scleral icterus.     Conjunctiva/sclera: Conjunctivae normal.      Pupils: Pupils are equal, round, and reactive to light.   Cardiovascular:      Rate and Rhythm: Normal rate and regular rhythm.      Pulses: Normal pulses.      Heart sounds: Normal heart sounds. No murmur heard.      Pulmonary:      Effort: Pulmonary effort is normal.      Breath sounds: Normal breath sounds. No wheezing, rhonchi or rales.   Musculoskeletal:      Cervical back: No rigidity or tenderness.   Lymphadenopathy:      Cervical: No cervical adenopathy.   Skin:     General: Skin is warm and dry.      Coloration: Skin is not jaundiced.      Findings: No rash.   Neurological:      General: No focal deficit present.      Mental Status: She is alert and oriented to person, place, and time.   Psychiatric:         Mood and Affect: Mood normal.         Thought Content: Thought content normal.         Judgment: Judgment normal.         Procedures    No visits with results within 30 Day(s) from this visit.   Latest known visit with results is:   Office Visit on 09/10/2021   Component Date Value Ref Range Status   • Glucose 09/11/2021 117* 65 - 99 mg/dL Final   • BUN 09/11/2021 8  6 - 20 mg/dL Final   • Creatinine 09/11/2021 0.80  0.57 - 1.00 mg/dL Final   • Sodium 09/11/2021 141  136 - 145 mmol/L Final   • Potassium " 09/11/2021 3.9  3.5 - 5.2 mmol/L Final   • Chloride 09/11/2021 108* 98 - 107 mmol/L Final   • CO2 09/11/2021 21.4* 22.0 - 29.0 mmol/L Final   • Calcium 09/11/2021 9.4  8.6 - 10.5 mg/dL Final   • Total Protein 09/11/2021 7.4  6.0 - 8.5 g/dL Final   • Albumin 09/11/2021 4.20  3.50 - 5.20 g/dL Final   • ALT (SGPT) 09/11/2021 14  1 - 33 U/L Final   • AST (SGOT) 09/11/2021 15  1 - 32 U/L Final   • Alkaline Phosphatase 09/11/2021 65  39 - 117 U/L Final   • Total Bilirubin 09/11/2021 0.2  0.0 - 1.2 mg/dL Final   • eGFR Non African Amer 09/11/2021 78  >60 mL/min/1.73 Final   • Globulin 09/11/2021 3.2  gm/dL Final   • A/G Ratio 09/11/2021 1.3  g/dL Final   • BUN/Creatinine Ratio 09/11/2021 10.0  7.0 - 25.0 Final   • Anion Gap 09/11/2021 11.6  5.0 - 15.0 mmol/L Final   • TSH 09/11/2021 2.310  0.270 - 4.200 uIU/mL Final   • Total Cholesterol 09/11/2021 137  0 - 200 mg/dL Final   • Triglycerides 09/11/2021 97  0 - 150 mg/dL Final   • HDL Cholesterol 09/11/2021 29* 40 - 60 mg/dL Final   • LDL Cholesterol  09/11/2021 90  0 - 100 mg/dL Final   • VLDL Cholesterol 09/11/2021 18  5 - 40 mg/dL Final   • LDL/HDL Ratio 09/11/2021 3.06   Final   • HCG, Urine, QL 09/10/2021 Negative  Negative Final   • Lot Number 09/10/2021 [id number]   Final   • Internal Positive Control 09/10/2021 Passed  Positive, Passed Final   • Internal Negative Control 09/10/2021 Passed  Negative, Passed Final   • WBC 09/11/2021 8.56  3.40 - 10.80 10*3/mm3 Final   • RBC 09/11/2021 4.31  3.77 - 5.28 10*6/mm3 Final   • Hemoglobin 09/11/2021 13.1  12.0 - 15.9 g/dL Final   • Hematocrit 09/11/2021 39.0  34.0 - 46.6 % Final   • MCV 09/11/2021 90.5  79.0 - 97.0 fL Final   • MCH 09/11/2021 30.4  26.6 - 33.0 pg Final   • MCHC 09/11/2021 33.6  31.5 - 35.7 g/dL Final   • RDW 09/11/2021 11.9* 12.3 - 15.4 % Final   • RDW-SD 09/11/2021 39.4  37.0 - 54.0 fl Final   • MPV 09/11/2021 10.6  6.0 - 12.0 fL Final   • Platelets 09/11/2021 346  140 - 450 10*3/mm3 Final   • Neutrophil %  09/11/2021 51.8  42.7 - 76.0 % Final   • Lymphocyte % 09/11/2021 38.0  19.6 - 45.3 % Final   • Monocyte % 09/11/2021 7.6  5.0 - 12.0 % Final   • Eosinophil % 09/11/2021 1.4  0.3 - 6.2 % Final   • Basophil % 09/11/2021 0.8  0.0 - 1.5 % Final   • Immature Grans % 09/11/2021 0.4  0.0 - 0.5 % Final   • Neutrophils, Absolute 09/11/2021 4.44  1.70 - 7.00 10*3/mm3 Final   • Lymphocytes, Absolute 09/11/2021 3.25* 0.70 - 3.10 10*3/mm3 Final   • Monocytes, Absolute 09/11/2021 0.65  0.10 - 0.90 10*3/mm3 Final   • Eosinophils, Absolute 09/11/2021 0.12  0.00 - 0.40 10*3/mm3 Final   • Basophils, Absolute 09/11/2021 0.07  0.00 - 0.20 10*3/mm3 Final   • Immature Grans, Absolute 09/11/2021 0.03  0.00 - 0.05 10*3/mm3 Final   • nRBC 09/11/2021 0.0  0.0 - 0.2 /100 WBC Final       ASSESSMENT/ PLAN:    Diagnoses and all orders for this visit:    1. COVID-19 virus infection (Primary)  Assessment & Plan:  Her symptoms were initially improved but she has since developed a cough and shortness of breath.  We will get a chest x-ray to rule out Covid pneumonia.  We will also check a D-dimer to rule out pulmonary embolism        Orders Placed Today:     No orders of the defined types were placed in this encounter.       Management Plan:     An After Visit Summary was printed and given to the patient at discharge.    Follow-up: No follow-ups on file.    Leobardo Trammell DO 2/3/2022 08:29 EST  This note was electronically signed.

## 2022-02-08 ENCOUNTER — HOSPITAL ENCOUNTER (OUTPATIENT)
Dept: CT IMAGING | Facility: HOSPITAL | Age: 47
Discharge: HOME | End: 2022-02-08
Admitting: FAMILY MEDICINE

## 2022-02-08 DIAGNOSIS — R79.89 POSITIVE D DIMER: ICD-10-CM

## 2022-02-08 PROCEDURE — 0 IOPAMIDOL PER 1 ML: Performed by: FAMILY MEDICINE

## 2022-02-08 PROCEDURE — 71275 CT ANGIOGRAPHY CHEST: CPT

## 2022-02-08 RX ADMIN — IOPAMIDOL 100 ML: 755 INJECTION, SOLUTION INTRAVENOUS at 11:03

## 2022-02-23 ENCOUNTER — OFFICE VISIT (OUTPATIENT)
Dept: FAMILY MEDICINE CLINIC | Facility: CLINIC | Age: 47
End: 2022-02-23

## 2022-02-23 ENCOUNTER — HOSPITAL ENCOUNTER (OUTPATIENT)
Dept: GENERAL RADIOLOGY | Facility: HOSPITAL | Age: 47
Discharge: HOME | End: 2022-02-23
Admitting: FAMILY MEDICINE

## 2022-02-23 VITALS
SYSTOLIC BLOOD PRESSURE: 154 MMHG | BODY MASS INDEX: 27.28 KG/M2 | DIASTOLIC BLOOD PRESSURE: 104 MMHG | HEART RATE: 107 BPM | HEIGHT: 68 IN | RESPIRATION RATE: 17 BRPM | WEIGHT: 180 LBS | OXYGEN SATURATION: 97 % | TEMPERATURE: 97.6 F

## 2022-02-23 DIAGNOSIS — M25.532 LEFT WRIST PAIN: ICD-10-CM

## 2022-02-23 DIAGNOSIS — U07.1 COVID-19 VIRUS INFECTION: Primary | ICD-10-CM

## 2022-02-23 DIAGNOSIS — R03.0 ELEVATED BLOOD PRESSURE READING: ICD-10-CM

## 2022-02-23 DIAGNOSIS — R06.02 SOB (SHORTNESS OF BREATH): ICD-10-CM

## 2022-02-23 PROBLEM — J12.82 PNEUMONIA DUE TO COVID-19 VIRUS: Status: ACTIVE | Noted: 2022-02-03

## 2022-02-23 PROCEDURE — 73110 X-RAY EXAM OF WRIST: CPT

## 2022-02-23 PROCEDURE — 99214 OFFICE O/P EST MOD 30 MIN: CPT | Performed by: FAMILY MEDICINE

## 2022-02-23 RX ORDER — DEXAMETHASONE 4 MG/1
TABLET ORAL
COMMUNITY
Start: 2022-02-04 | End: 2022-02-23

## 2022-02-23 NOTE — ASSESSMENT & PLAN NOTE
Her shortness of breath is improved.  I think this is related to her Covid pneumonia I think has this removed resolves over the next month 2 or 3 her shortness of breath should resolve as well.

## 2022-02-23 NOTE — PROGRESS NOTES
Chief Complaint   Patient presents with   • Follow-up     Ct Scan    • Arm Pain     Left        Subjective     Britany Pyle  has a past medical history of Broken bones (2009) and Migraine headache.    Shortness of breath-she states her cough is much improved.  She also states her shortness of breath is improved but still persistent.  The CT of her chest just showed some mild residual Covid pneumonia.    Left arm pain-she states the last 2 weeks she has noticed some pain in her left arm and hand.  She states this started started in her fingers then her wrist and then up to her elbow and at times even radiates up to her shoulder.  It is not constant and comes and goes.  She is a right-hand dominant individual.  She has used some Advil with some benefit.  She states occasionally even the pain awakens her at night.      PHQ-2 Depression Screening  Little interest or pleasure in doing things?     Feeling down, depressed, or hopeless?     PHQ-2 Total Score     PHQ-9 Depression Screening  Little interest or pleasure in doing things?     Feeling down, depressed, or hopeless?     Trouble falling or staying asleep, or sleeping too much?     Feeling tired or having little energy?     Poor appetite or overeating?     Feeling bad about yourself - or that you are a failure or have let yourself or your family down?     Trouble concentrating on things, such as reading the newspaper or watching television?     Moving or speaking so slowly that other people could have noticed? Or the opposite - being so fidgety or restless that you have been moving around a lot more than usual?     Thoughts that you would be better off dead, or of hurting yourself in some way?     PHQ-9 Total Score     If you checked off any problems, how difficult have these problems made it for you to do your work, take care of things at home, or get along with other people?       No Known Allergies    Prior to Admission medications    Medication Sig Start  Date End Date Taking? Authorizing Provider   albuterol sulfate  (90 Base) MCG/ACT inhaler Inhale 2 puffs Every 6 (Six) Hours As Needed for Wheezing. 2/3/22  Yes Leobardo Trammell,    cholecalciferol (VITAMIN D3) 25 MCG (1000 UT) tablet Take 1,000 Units by mouth Daily.   Yes Elizabeth Valenzuela MD   ELDERBERRY PO Take  by mouth.   Yes ProviderElizabeth MD   etonogestrel-ethinyl estradiol (NUVARING) 0.12-0.015 MG/24HR vaginal ring Insert 1 each into the vagina. 9/7/21 9/7/22 Yes Elizabeth Valenzuela MD   fluticasone (Flonase) 50 MCG/ACT nasal spray 2 sprays into the nostril(s) as directed by provider Daily. 12/3/21  Yes Nery Baires APRN   Multiple Vitamins-Minerals (WOMENS MULTIVITAMIN PO) Take  by mouth.   Yes ProviderElizabeth MD   dexamethasone (DECADRON) 4 MG tablet  2/4/22 2/23/22  ProviderElizabeth MD        Patient Active Problem List   Diagnosis   • Abnormal uterine and vaginal bleeding, unspecified   • Uterovaginal prolapse, incomplete   • Annual physical exam   • Possible pregnancy   • Broken bones   • Migraine   • Pneumonia due to COVID-19 virus   • SOB (shortness of breath)   • Left wrist pain   • Elevated blood pressure reading        Past Surgical History:   Procedure Laterality Date   • LEG SURGERY      right leg       Social History     Socioeconomic History   • Marital status:    Tobacco Use   • Smoking status: Never Smoker   • Smokeless tobacco: Never Used   Vaping Use   • Vaping Use: Never used   Substance and Sexual Activity   • Alcohol use: Yes     Comment: occasionally   • Drug use: Never   • Sexual activity: Not Currently       Family History   Problem Relation Age of Onset   • Hypertension Mother        Family history, surgical history, past medical history, Allergies and med's reviewed with patient today and updated in [facility] EMR.     ROS:  Review of Systems   Constitutional: Negative for fatigue.   Respiratory: Positive for cough and shortness of  "breath. Negative for chest tightness and wheezing.    Musculoskeletal:        (+) Left arm and hand pain   Neurological: Negative for weakness and numbness.       OBJECTIVE:  Vitals:    02/23/22 1437 02/23/22 1449   BP: (!) 168/112 (!) 154/104   BP Location:  Right arm   Patient Position:  Sitting   Cuff Size:  Adult   Pulse: 107    Resp: 17    Temp: 97.6 °F (36.4 °C)    TempSrc: Temporal    SpO2: 97%    Weight: 81.6 kg (180 lb)    Height: 172.7 cm (68\")      No exam data present   Body mass index is 27.37 kg/m².  No LMP recorded.    Physical Exam  Vitals and nursing note reviewed.   Constitutional:       General: She is not in acute distress.     Appearance: Normal appearance. She is normal weight.   HENT:      Head: Normocephalic.   Cardiovascular:      Rate and Rhythm: Normal rate and regular rhythm.      Heart sounds: Normal heart sounds. No murmur heard.      Pulmonary:      Breath sounds: Normal breath sounds. No wheezing, rhonchi or rales.   Musculoskeletal:      Left forearm: No swelling, deformity or tenderness.      Left wrist: Tenderness present. No swelling, deformity or crepitus. Normal range of motion.      Left hand: No swelling, deformity or tenderness. Normal range of motion.   Neurological:      Mental Status: She is alert.         Procedures    Office Visit on 02/03/2022   Component Date Value Ref Range Status   • D-Dimer, Quantitative 02/03/2022 0.73* 0.00 - 0.59 mg/L (FEU) Final       ASSESSMENT/ PLAN:    Diagnoses and all orders for this visit:    1. COVID-19 virus infection (Primary)    2. SOB (shortness of breath)  Assessment & Plan:  Her shortness of breath is improved.  I think this is related to her Covid pneumonia I think has this removed resolves over the next month 2 or 3 her shortness of breath should resolve as well.      3. Left wrist pain  Assessment & Plan:  She has had an insidious kind of left wrist pain.  We will get x-rays of this area.  If this is negative she could always " have carpal tunnel syndrome.  Thus if her x-ray is negative we will consider a nerve conduction study.    Orders:  -     XR Wrist 3+ View Left; Future    4. Elevated blood pressure reading  Assessment & Plan:  Her blood pressure remains elevated here today.  Beginning of the month her blood pressure was perfectly fine.  She also has been checking it at home and is good at home.  This may be more anxiety related such as a whitecoat syndrome.        Orders Placed Today:     No orders of the defined types were placed in this encounter.       Management Plan:     An After Visit Summary was printed and given to the patient at discharge.    Follow-up: Return in about 4 weeks (around 3/23/2022) for Recheck.    Leobardo Trammell,  2/23/2022 15:31 EST  This note was electronically signed.

## 2022-02-23 NOTE — ASSESSMENT & PLAN NOTE
Her blood pressure remains elevated here today.  Beginning of the month her blood pressure was perfectly fine.  She also has been checking it at home and is good at home.  This may be more anxiety related such as a whitecoat syndrome.

## 2022-02-23 NOTE — ASSESSMENT & PLAN NOTE
She has had an insidious kind of left wrist pain.  We will get x-rays of this area.  If this is negative she could always have carpal tunnel syndrome.  Thus if her x-ray is negative we will consider a nerve conduction study.

## 2022-04-05 NOTE — ADDENDUM NOTE
Addended by: FATUMA BULLOCK on: 2/3/2022 08:46 AM     Modules accepted: Orders     Message sent to patient through my [facility]

## 2022-07-11 ENCOUNTER — TELEPHONE (OUTPATIENT)
Dept: FAMILY MEDICINE CLINIC | Facility: CLINIC | Age: 47
End: 2022-07-11

## 2022-07-21 ENCOUNTER — OFFICE VISIT (OUTPATIENT)
Dept: PODIATRY | Facility: CLINIC | Age: 47
End: 2022-07-21

## 2022-07-21 VITALS
DIASTOLIC BLOOD PRESSURE: 105 MMHG | OXYGEN SATURATION: 98 % | SYSTOLIC BLOOD PRESSURE: 160 MMHG | WEIGHT: 179 LBS | HEART RATE: 95 BPM | BODY MASS INDEX: 27.13 KG/M2 | HEIGHT: 68 IN | TEMPERATURE: 97.5 F

## 2022-07-21 DIAGNOSIS — S92.354A CLOSED NONDISPLACED FRACTURE OF FIFTH METATARSAL BONE OF RIGHT FOOT, INITIAL ENCOUNTER: ICD-10-CM

## 2022-07-21 DIAGNOSIS — M79.671 FOOT PAIN, RIGHT: Primary | ICD-10-CM

## 2022-07-21 PROCEDURE — 99203 OFFICE O/P NEW LOW 30 MIN: CPT | Performed by: PODIATRIST

## 2022-07-21 NOTE — PROGRESS NOTES
[facility] - PODIATRY    Today's Date: 07/21/22    Patient Name: Britany Pyle  MRN: [id number]  CSN: [id number]  PCP: Leobardo Trammell DO  Referring Provider: Referring, Self    SUBJECTIVE     Chief Complaint   Patient presents with   • Right Foot - Pain     Fracture 5th metatarsal  Fall 7/10/22     HPI: Britany Pyle, a 46 y.o.female, presents to clinic.    New, Established, New Problem:  new    Location:  Right 5th metatarsal base    Duration: 17 July 2022    Onset: Acute, twisted foot or right foot flops    Nature: Sore, achy    Stable, worsening, improving: Stable    Aggravating factors:  Patient relates pain is aggravated by shoe gear and ambulation.      Previous Treatment: Urgent care, x-rays, CAM Walker, crutches    Patient denies any fevers, chills, nausea, vomiting, shortness of breath, nor any other constitutional signs nor symptoms.    No other pedal complaints at this time.    Past Medical History:   Diagnosis Date   • Broken bones 2009   • Migraine headache      Past Surgical History:   Procedure Laterality Date   • LEG SURGERY      right leg     Family History   Problem Relation Age of Onset   • Hypertension Mother      Social History     Socioeconomic History   • Marital status:    Tobacco Use   • Smoking status: Never Smoker   • Smokeless tobacco: Never Used   Vaping Use   • Vaping Use: Never used   Substance and Sexual Activity   • Alcohol use: Yes     Comment: occasionally   • Drug use: Never   • Sexual activity: Not Currently     No Known Allergies  Current Outpatient Medications   Medication Sig Dispense Refill   • ELDERBERRY PO Take  by mouth.     • etonogestrel-ethinyl estradiol (NUVARING) 0.12-0.015 MG/24HR vaginal ring Insert 1 each into the vagina.     • fluticasone (Flonase) 50 MCG/ACT nasal spray 2 sprays into the nostril(s) as directed by provider Daily. 16 g 5   • HYDROcodone-acetaminophen (NORCO) 5-325 MG per tablet Take 1 tablet by mouth Every  4 (Four) Hours As Needed for Moderate Pain . 18 tablet 0   • Multiple Vitamins-Minerals (WOMENS MULTIVITAMIN PO) Take  by mouth.       No current facility-administered medications for this visit.     Review of Systems   Musculoskeletal:        Right fifth metatarsal fracture   All other systems reviewed and are negative.      OBJECTIVE     Vitals:    07/21/22 0943   BP: (!) 160/105   Pulse: 95   Temp: 97.5 °F (36.4 °C)   SpO2: 98%       WBC   Date Value Ref Range Status   09/11/2021 8.56 3.40 - 10.80 10*3/mm3 Final   05/08/2020 6.90 3.40 - 10.80 10*3/mm3 Final     RBC   Date Value Ref Range Status   09/11/2021 4.31 3.77 - 5.28 10*6/mm3 Final   05/08/2020 4.40 3.77 - 5.28 10*6/mm3 Final     Hemoglobin   Date Value Ref Range Status   09/11/2021 13.1 12.0 - 15.9 g/dL Final     Hematocrit   Date Value Ref Range Status   09/11/2021 39.0 34.0 - 46.6 % Final     MCV   Date Value Ref Range Status   09/11/2021 90.5 79.0 - 97.0 fL Final     MCH   Date Value Ref Range Status   09/11/2021 30.4 26.6 - 33.0 pg Final     MCHC   Date Value Ref Range Status   09/11/2021 33.6 31.5 - 35.7 g/dL Final     RDW   Date Value Ref Range Status   09/11/2021 11.9 (L) 12.3 - 15.4 % Final     RDW-SD   Date Value Ref Range Status   09/11/2021 39.4 37.0 - 54.0 fl Final     MPV   Date Value Ref Range Status   09/11/2021 10.6 6.0 - 12.0 fL Final     Platelets   Date Value Ref Range Status   09/11/2021 346 140 - 450 10*3/mm3 Final     Neutrophil %   Date Value Ref Range Status   09/11/2021 51.8 42.7 - 76.0 % Final     Lymphocyte %   Date Value Ref Range Status   09/11/2021 38.0 19.6 - 45.3 % Final     Monocyte %   Date Value Ref Range Status   09/11/2021 7.6 5.0 - 12.0 % Final     Eosinophil %   Date Value Ref Range Status   09/11/2021 1.4 0.3 - 6.2 % Final     Basophil %   Date Value Ref Range Status   09/11/2021 0.8 0.0 - 1.5 % Final     Immature Grans %   Date Value Ref Range Status   09/11/2021 0.4 0.0 - 0.5 % Final     Neutrophils, Absolute    Date Value Ref Range Status   09/11/2021 4.44 1.70 - 7.00 10*3/mm3 Final     Lymphocytes, Absolute   Date Value Ref Range Status   09/11/2021 3.25 (H) 0.70 - 3.10 10*3/mm3 Final     Monocytes, Absolute   Date Value Ref Range Status   09/11/2021 0.65 0.10 - 0.90 10*3/mm3 Final     Eosinophils, Absolute   Date Value Ref Range Status   09/11/2021 0.12 0.00 - 0.40 10*3/mm3 Final     Basophils, Absolute   Date Value Ref Range Status   09/11/2021 0.07 0.00 - 0.20 10*3/mm3 Final     Immature Grans, Absolute   Date Value Ref Range Status   09/11/2021 0.03 0.00 - 0.05 10*3/mm3 Final     nRBC   Date Value Ref Range Status   09/11/2021 0.0 0.0 - 0.2 /100 WBC Final         Lab Results   Component Value Date    GLUCOSE 117 (H) 09/11/2021    BUN 8 09/11/2021    CREATININE 0.80 09/11/2021    EGFRIFNONA 78 09/11/2021    EGFRIFAFRI 89 05/08/2020    BCR 10.0 09/11/2021    K 3.9 09/11/2021    CO2 21.4 (L) 09/11/2021    CALCIUM 9.4 09/11/2021    PROTENTOTREF 7.5 05/08/2020    ALBUMIN 4.20 09/11/2021    LABIL2 1.5 05/08/2020    AST 15 09/11/2021    ALT 14 09/11/2021       Patient seen in no apparent distress.      PHYSICAL EXAM:     Foot/Ankle Exam:       General:   Appearance: appears stated age and healthy    Orientation: AAOx3    Affect: appropriate    Shoes: Walker on right foot.    VASCULAR      Right Foot Vascularity   Normal vascular exam    Dorsalis pedis:  2+  Posterior tibial:  2+  Skin Temperature: warm    Edema Grading:  None  CFT:  < 3 seconds  Pedal Hair Growth:  Present  Varicosities: none       Left Foot Vascularity   Normal vascular exam    Dorsalis pedis:  2+  Posterior tibial:  2+  Skin Temperature: warm    Edema Grading:  None  CFT:  < 3 seconds  Pedal Hair Growth:  Present  Varicosities: none        NEUROLOGIC     Right Foot Neurologic   Normal sensation    Light touch sensation:  Normal  Vibratory sensation:  Normal  Hot/Cold sensation: normal    Protective Sensation using [place]-Gena Monofilament:  10      Left Foot Neurologic   Normal sensation    Light touch sensation:  Normal  Vibratory sensation:  Normal  Hot/cold sensation: normal    Protective Sensation using [place]-Gena Monofilament:  10     MUSCULOSKELETAL      Right Foot Musculoskeletal   Ecchymosis:  5th metatarsal base  Tenderness: fifth metatarsal base       MUSCLE STRENGTH     Right Foot Muscle Strength   Foot dorsiflexion:  4-  Foot plantar flexion:  4-  Foot inversion:  4-  Foot eversion:  4-     Left Foot Muscle Strength   Foot dorsiflexion:  4  Foot plantar flexion:  4  Foot inversion:  4  Foot eversion:  4     RANGE OF MOTION      Right Foot Range of Motion   Foot and ankle ROM within normal limits       Left Foot Range of Motion   Foot and ankle ROM within normal limits       DERMATOLOGIC     Right Foot Dermatologic   Skin: skin intact    Nails: normal       Left Foot Dermatologic   Skin: skin intact    Nails: normal        RADIOLOGY:      XR Foot 3+ View Right    Result Date: 7/11/2022  Narrative: PROCEDURE: XR FOOT 3+ VW RIGHT  COMPARISON: None  INDICATIONS: pain in the 5th metatarsal area after twisting  FINDINGS:  BONES: Transverse nondisplaced fracture of the base of the right 5th metatarsal. SOFT TISSUES: Lateral soft tissue swelling is present. EFFUSION: None visible.  OTHER: Negative.       Impression:  Transverse nondisplaced fracture of the base of the right 5th metatarsal.     IDALIA COREA MD       Electronically Signed and Approved By: IDALIA COREA MD on 7/11/2022 at 17:35               ASSESSMENT/PLAN     Diagnoses and all orders for this visit:    1. Foot pain, right (Primary)    2. Closed nondisplaced fracture of fifth metatarsal bone of right foot, initial encounter        Comprehensive lower extremity examination and evaluation was performed.    Discussed findings and treatment plan including risks, benefits, and treatment options with patient in detail. Patient agreed with treatment plan.    Medications and allergies  reviewed.  Reviewed available lab values along with other pertinent labs.  These were discussed with the patient.    Patient continue strict nonweightbearing to right foot with crutches.  Dr. Crow advised patient may resume driving, but advised not to drive while wearing an ambulatory device.  Advised quick/hard depression of brakes could cause injury to areas.  Also advised of possible legal implications while driving in restrictive device.  The patient states understanding and agreement with these instructions.    Rice Therapy: It is important to treat any injury as soon as possible to help control swelling and increase recovery time. The recognized regimen for immediate treatment of sport injuries includes rest, ice (cold application), compression, and elevation (RICE). Remove the injured athlete from play, apply ice to the affected area, wrap or compress the injured area with an elastic bandage when appropriate, and elevate the injured area above heart level to reduce swelling.  The patient is to not use ice for longer than 20 minutes at a time, with at least 20 minutes of no ice usage between applications.  The patient states understanding and agreement with this plan.    An After Visit Summary was printed and given to the patient at discharge, including (if requested) any available informative/educational handouts regarding diagnosis, treatment, or medications. All questions were answered to patient/family satisfaction. Should symptoms fail to improve or worsen they agree to call or return to clinic or to go to the Emergency Department. Discussed the importance of following up with any needed screening tests/labs/specialist appointments and any requested follow-up recommended by me today. Importance of maintaining follow-up discussed and patient accepts that missed appointments can delay diagnosis and potentially lead to worsening of conditions.    Return in about 3 weeks (around 8/11/2022) for X-ray  needed; 3-week follow-up., or sooner if acute issues arise.    This document has been electronically signed by Jorge A Crow DPM on July 21, 2022 10:08 EDT

## 2022-08-11 ENCOUNTER — OFFICE VISIT (OUTPATIENT)
Dept: PODIATRY | Facility: CLINIC | Age: 47
End: 2022-08-11

## 2022-08-11 VITALS
SYSTOLIC BLOOD PRESSURE: 144 MMHG | DIASTOLIC BLOOD PRESSURE: 90 MMHG | HEIGHT: 68 IN | BODY MASS INDEX: 27.22 KG/M2 | HEART RATE: 112 BPM | TEMPERATURE: 97.3 F | OXYGEN SATURATION: 98 %

## 2022-08-11 DIAGNOSIS — S92.354D CLOSED NONDISPLACED FRACTURE OF FIFTH METATARSAL BONE OF RIGHT FOOT WITH ROUTINE HEALING, SUBSEQUENT ENCOUNTER: ICD-10-CM

## 2022-08-11 DIAGNOSIS — S92.354A CLOSED NONDISPLACED FRACTURE OF FIFTH METATARSAL BONE OF RIGHT FOOT, INITIAL ENCOUNTER: ICD-10-CM

## 2022-08-11 DIAGNOSIS — M79.671 FOOT PAIN, RIGHT: Primary | ICD-10-CM

## 2022-08-11 PROCEDURE — 99213 OFFICE O/P EST LOW 20 MIN: CPT | Performed by: PODIATRIST

## 2022-08-11 NOTE — PROGRESS NOTES
[facility] - PODIATRY    Today's Date: 08/11/22    Patient Name: Britany Pyle  MRN: [id number]  CSN: [id number]  PCP: Leobardo Trammell DO  Referring Provider: No ref. provider found    SUBJECTIVE     Chief Complaint   Patient presents with   • Right Foot - Follow-up, Fracture     HPI: Britany Pyle, a 46 y.o.female, presents to clinic.    New, Established, New Problem:  est    Location:  Right 5th metatarsal base    Duration: 17 July 2022    Onset: Acute, twisted foot or right foot flops    Nature: Sore, achy    Stable, worsening, improving: Stable    Aggravating factors:  Patient relates pain is aggravated by shoe gear and ambulation.      Previous Treatment: Urgent care, x-rays, CAM Walker, crutches, iWalk    Patient denies any fevers, chills, nausea, vomiting, shortness of breath, nor any other constitutional signs nor symptoms.    No other pedal complaints at this time.    Past Medical History:   Diagnosis Date   • Broken bones 2009   • Migraine headache      Past Surgical History:   Procedure Laterality Date   • LEG SURGERY      right leg     Family History   Problem Relation Age of Onset   • Hypertension Mother      Social History     Socioeconomic History   • Marital status:    Tobacco Use   • Smoking status: Never Smoker   • Smokeless tobacco: Never Used   Vaping Use   • Vaping Use: Never used   Substance and Sexual Activity   • Alcohol use: Yes     Comment: occasionally   • Drug use: Never   • Sexual activity: Not Currently     No Known Allergies  Current Outpatient Medications   Medication Sig Dispense Refill   • ELDERBERRY PO Take  by mouth.     • etonogestrel-ethinyl estradiol (NUVARING) 0.12-0.015 MG/24HR vaginal ring Insert 1 each into the vagina.     • fluticasone (Flonase) 50 MCG/ACT nasal spray 2 sprays into the nostril(s) as directed by provider Daily. 16 g 5   • Multiple Vitamins-Minerals (WOMENS MULTIVITAMIN PO) Take  by mouth.     •  HYDROcodone-acetaminophen (NORCO) 5-325 MG per tablet Take 1 tablet by mouth Every 4 (Four) Hours As Needed for Moderate Pain . 18 tablet 0     No current facility-administered medications for this visit.     Review of Systems   Musculoskeletal:        Right fifth metatarsal fracture   All other systems reviewed and are negative.      OBJECTIVE     Vitals:    08/11/22 1336   BP: 144/90   Pulse: 112   Temp: 97.3 °F (36.3 °C)   SpO2: 98%       WBC   Date Value Ref Range Status   09/11/2021 8.56 3.40 - 10.80 10*3/mm3 Final   05/08/2020 6.90 3.40 - 10.80 10*3/mm3 Final     RBC   Date Value Ref Range Status   09/11/2021 4.31 3.77 - 5.28 10*6/mm3 Final   05/08/2020 4.40 3.77 - 5.28 10*6/mm3 Final     Hemoglobin   Date Value Ref Range Status   09/11/2021 13.1 12.0 - 15.9 g/dL Final     Hematocrit   Date Value Ref Range Status   09/11/2021 39.0 34.0 - 46.6 % Final     MCV   Date Value Ref Range Status   09/11/2021 90.5 79.0 - 97.0 fL Final     MCH   Date Value Ref Range Status   09/11/2021 30.4 26.6 - 33.0 pg Final     MCHC   Date Value Ref Range Status   09/11/2021 33.6 31.5 - 35.7 g/dL Final     RDW   Date Value Ref Range Status   09/11/2021 11.9 (L) 12.3 - 15.4 % Final     RDW-SD   Date Value Ref Range Status   09/11/2021 39.4 37.0 - 54.0 fl Final     MPV   Date Value Ref Range Status   09/11/2021 10.6 6.0 - 12.0 fL Final     Platelets   Date Value Ref Range Status   09/11/2021 346 140 - 450 10*3/mm3 Final     Neutrophil %   Date Value Ref Range Status   09/11/2021 51.8 42.7 - 76.0 % Final     Lymphocyte %   Date Value Ref Range Status   09/11/2021 38.0 19.6 - 45.3 % Final     Monocyte %   Date Value Ref Range Status   09/11/2021 7.6 5.0 - 12.0 % Final     Eosinophil %   Date Value Ref Range Status   09/11/2021 1.4 0.3 - 6.2 % Final     Basophil %   Date Value Ref Range Status   09/11/2021 0.8 0.0 - 1.5 % Final     Immature Grans %   Date Value Ref Range Status   09/11/2021 0.4 0.0 - 0.5 % Final     Neutrophils, Absolute    Date Value Ref Range Status   09/11/2021 4.44 1.70 - 7.00 10*3/mm3 Final     Lymphocytes, Absolute   Date Value Ref Range Status   09/11/2021 3.25 (H) 0.70 - 3.10 10*3/mm3 Final     Monocytes, Absolute   Date Value Ref Range Status   09/11/2021 0.65 0.10 - 0.90 10*3/mm3 Final     Eosinophils, Absolute   Date Value Ref Range Status   09/11/2021 0.12 0.00 - 0.40 10*3/mm3 Final     Basophils, Absolute   Date Value Ref Range Status   09/11/2021 0.07 0.00 - 0.20 10*3/mm3 Final     Immature Grans, Absolute   Date Value Ref Range Status   09/11/2021 0.03 0.00 - 0.05 10*3/mm3 Final     nRBC   Date Value Ref Range Status   09/11/2021 0.0 0.0 - 0.2 /100 WBC Final         Lab Results   Component Value Date    GLUCOSE 117 (H) 09/11/2021    BUN 8 09/11/2021    CREATININE 0.80 09/11/2021    EGFRIFNONA 78 09/11/2021    EGFRIFAFRI 89 05/08/2020    BCR 10.0 09/11/2021    K 3.9 09/11/2021    CO2 21.4 (L) 09/11/2021    CALCIUM 9.4 09/11/2021    PROTENTOTREF 7.5 05/08/2020    ALBUMIN 4.20 09/11/2021    LABIL2 1.5 05/08/2020    AST 15 09/11/2021    ALT 14 09/11/2021       Patient seen in no apparent distress.      PHYSICAL EXAM:     Foot/Ankle Exam:       General:   Appearance: appears stated age and healthy    Orientation: AAOx3    Affect: appropriate    Shoes: Walker on right foot & iWalk device.    VASCULAR      Right Foot Vascularity   Normal vascular exam    Dorsalis pedis:  2+  Posterior tibial:  2+  Skin Temperature: warm    Edema Grading:  None  CFT:  < 3 seconds  Pedal Hair Growth:  Present  Varicosities: none       Left Foot Vascularity   Normal vascular exam    Dorsalis pedis:  2+  Posterior tibial:  2+  Skin Temperature: warm    Edema Grading:  None  CFT:  < 3 seconds  Pedal Hair Growth:  Present  Varicosities: none        NEUROLOGIC     Right Foot Neurologic   Normal sensation    Light touch sensation:  Normal  Vibratory sensation:  Normal  Hot/Cold sensation: normal    Protective Sensation using [place]-Gena  Monofilament:  10     Left Foot Neurologic   Normal sensation    Light touch sensation:  Normal  Vibratory sensation:  Normal  Hot/cold sensation: normal    Protective Sensation using [place]-Gena Monofilament:  10     MUSCULOSKELETAL      Right Foot Musculoskeletal   Ecchymosis:  5th metatarsal base  Tenderness: fifth metatarsal base    Tenderness comment:  Minimal     MUSCLE STRENGTH     Right Foot Muscle Strength   Foot dorsiflexion:  4-  Foot plantar flexion:  4-  Foot inversion:  4-  Foot eversion:  4-     Left Foot Muscle Strength   Foot dorsiflexion:  4  Foot plantar flexion:  4  Foot inversion:  4  Foot eversion:  4     RANGE OF MOTION      Right Foot Range of Motion   Foot and ankle ROM within normal limits       Left Foot Range of Motion   Foot and ankle ROM within normal limits       DERMATOLOGIC     Right Foot Dermatologic   Skin: skin intact    Nails: normal       Left Foot Dermatologic   Skin: skin intact    Nails: normal        RADIOLOGY:      XR Foot 3+ View Right    Result Date: 8/11/2022  Narrative: IN-OFFICE IMAGING:  Standing, weightbearing, 3 view, AP, MO, Lateral, right foot Indication: Fifth metatarsal fracture follow-up Findings: No change in fracture fragments of the base of the fifth metatarsal shaft.  Early trabeculation seen across fracture fragments.  No gapping. Comparison: No other changes seen compared to previous views.       ASSESSMENT/PLAN     Diagnoses and all orders for this visit:    1. Foot pain, right (Primary)    2. Closed nondisplaced fracture of fifth metatarsal bone of right foot, initial encounter    3. Closed nondisplaced fracture of fifth metatarsal bone of right foot with routine healing, subsequent encounter        Comprehensive lower extremity examination and evaluation was performed.    Discussed findings and treatment plan including risks, benefits, and treatment options with patient in detail. Patient agreed with treatment plan.    Medications and allergies  reviewed.  Reviewed available lab values along with other pertinent labs.  These were discussed with the patient.    Patient continue strict nonweightbearing to right foot with crutches.  Dr. Crow advised patient may resume driving, but advised not to drive while wearing an ambulatory device.  Advised quick/hard depression of brakes could cause injury to areas.  Also advised of possible legal implications while driving in restrictive device.  The patient states understanding and agreement with these instructions.    Rice Therapy: It is important to treat any injury as soon as possible to help control swelling and increase recovery time. The recognized regimen for immediate treatment of sport injuries includes rest, ice (cold application), compression, and elevation (RICE). Remove the injured athlete from play, apply ice to the affected area, wrap or compress the injured area with an elastic bandage when appropriate, and elevate the injured area above heart level to reduce swelling.  The patient is to not use ice for longer than 20 minutes at a time, with at least 20 minutes of no ice usage between applications.  The patient states understanding and agreement with this plan.    An After Visit Summary was printed and given to the patient at discharge, including (if requested) any available informative/educational handouts regarding diagnosis, treatment, or medications. All questions were answered to patient/family satisfaction. Should symptoms fail to improve or worsen they agree to call or return to clinic or to go to the Emergency Department. Discussed the importance of following up with any needed screening tests/labs/specialist appointments and any requested follow-up recommended by me today. Importance of maintaining follow-up discussed and patient accepts that missed appointments can delay diagnosis and potentially lead to worsening of conditions.    Return in about 3 weeks (around 9/1/2022) for X-ray  needed, probable discharge., or sooner if acute issues arise.    This document has been electronically signed by Jorge A Crow DPM on August 11, 2022 14:00 EDT

## 2022-08-31 ENCOUNTER — OFFICE VISIT (OUTPATIENT)
Dept: PODIATRY | Facility: CLINIC | Age: 47
End: 2022-08-31

## 2022-08-31 VITALS
WEIGHT: 179 LBS | SYSTOLIC BLOOD PRESSURE: 153 MMHG | BODY MASS INDEX: 27.13 KG/M2 | TEMPERATURE: 97.3 F | HEART RATE: 101 BPM | DIASTOLIC BLOOD PRESSURE: 95 MMHG | HEIGHT: 68 IN | OXYGEN SATURATION: 99 %

## 2022-08-31 DIAGNOSIS — S92.354D CLOSED NONDISPLACED FRACTURE OF FIFTH METATARSAL BONE OF RIGHT FOOT WITH ROUTINE HEALING, SUBSEQUENT ENCOUNTER: ICD-10-CM

## 2022-08-31 DIAGNOSIS — M79.671 FOOT PAIN, RIGHT: Primary | ICD-10-CM

## 2022-08-31 PROCEDURE — 99213 OFFICE O/P EST LOW 20 MIN: CPT | Performed by: PODIATRIST

## 2022-08-31 NOTE — PROGRESS NOTES
[facility] - PODIATRY    Today's Date: 08/31/22    Patient Name: Britany Pyle  MRN: [id number]  CSN: [id number]  PCP: Leobardo Trammell DO  Referring Provider: No ref. provider found    SUBJECTIVE     Chief Complaint   Patient presents with   • Right Foot - Follow-up     fracture     HPI: Britany Pyle, a 46 y.o.female, presents to clinic.    New, Established, New Problem:  est    Location:  Right 5th metatarsal base    Duration: 17 July 2022    Onset: Acute, twisted foot or right foot flops    Nature: Sore, achy    Stable, worsening, improving: Stable    Aggravating factors:  Patient relates pain is aggravated by shoe gear and ambulation.      Previous Treatment: Urgent care, x-rays, CAM Walker, crutches, iWalk    Patient denies any fevers, chills, nausea, vomiting, shortness of breath, nor any other constitutional signs nor symptoms.    No other pedal complaints at this time.    Past Medical History:   Diagnosis Date   • Broken bones 2009   • Migraine headache      Past Surgical History:   Procedure Laterality Date   • LEG SURGERY      right leg     Family History   Problem Relation Age of Onset   • Hypertension Mother      Social History     Socioeconomic History   • Marital status:    Tobacco Use   • Smoking status: Never Smoker   • Smokeless tobacco: Never Used   Vaping Use   • Vaping Use: Never used   Substance and Sexual Activity   • Alcohol use: Yes     Comment: occasionally   • Drug use: Never   • Sexual activity: Not Currently     No Known Allergies  Current Outpatient Medications   Medication Sig Dispense Refill   • ELDERBERRY PO Take  by mouth.     • etonogestrel-ethinyl estradiol (NUVARING) 0.12-0.015 MG/24HR vaginal ring Insert 1 each into the vagina.     • fluticasone (Flonase) 50 MCG/ACT nasal spray 2 sprays into the nostril(s) as directed by provider Daily. 16 g 5   • HYDROcodone-acetaminophen (NORCO) 5-325 MG per tablet Take 1 tablet by mouth Every 4 (Four)  Hours As Needed for Moderate Pain . 18 tablet 0   • Multiple Vitamins-Minerals (WOMENS MULTIVITAMIN PO) Take  by mouth.       No current facility-administered medications for this visit.     Review of Systems   Musculoskeletal:        Right fifth metatarsal fracture, improving   All other systems reviewed and are negative.      OBJECTIVE     Vitals:    08/31/22 1311   BP: 153/95   Pulse: 101   Temp: 97.3 °F (36.3 °C)   SpO2: 99%       WBC   Date Value Ref Range Status   09/11/2021 8.56 3.40 - 10.80 10*3/mm3 Final   05/08/2020 6.90 3.40 - 10.80 10*3/mm3 Final     RBC   Date Value Ref Range Status   09/11/2021 4.31 3.77 - 5.28 10*6/mm3 Final   05/08/2020 4.40 3.77 - 5.28 10*6/mm3 Final     Hemoglobin   Date Value Ref Range Status   09/11/2021 13.1 12.0 - 15.9 g/dL Final     Hematocrit   Date Value Ref Range Status   09/11/2021 39.0 34.0 - 46.6 % Final     MCV   Date Value Ref Range Status   09/11/2021 90.5 79.0 - 97.0 fL Final     MCH   Date Value Ref Range Status   09/11/2021 30.4 26.6 - 33.0 pg Final     MCHC   Date Value Ref Range Status   09/11/2021 33.6 31.5 - 35.7 g/dL Final     RDW   Date Value Ref Range Status   09/11/2021 11.9 (L) 12.3 - 15.4 % Final     RDW-SD   Date Value Ref Range Status   09/11/2021 39.4 37.0 - 54.0 fl Final     MPV   Date Value Ref Range Status   09/11/2021 10.6 6.0 - 12.0 fL Final     Platelets   Date Value Ref Range Status   09/11/2021 346 140 - 450 10*3/mm3 Final     Neutrophil %   Date Value Ref Range Status   09/11/2021 51.8 42.7 - 76.0 % Final     Lymphocyte %   Date Value Ref Range Status   09/11/2021 38.0 19.6 - 45.3 % Final     Monocyte %   Date Value Ref Range Status   09/11/2021 7.6 5.0 - 12.0 % Final     Eosinophil %   Date Value Ref Range Status   09/11/2021 1.4 0.3 - 6.2 % Final     Basophil %   Date Value Ref Range Status   09/11/2021 0.8 0.0 - 1.5 % Final     Immature Grans %   Date Value Ref Range Status   09/11/2021 0.4 0.0 - 0.5 % Final     Neutrophils, Absolute   Date  Value Ref Range Status   09/11/2021 4.44 1.70 - 7.00 10*3/mm3 Final     Lymphocytes, Absolute   Date Value Ref Range Status   09/11/2021 3.25 (H) 0.70 - 3.10 10*3/mm3 Final     Monocytes, Absolute   Date Value Ref Range Status   09/11/2021 0.65 0.10 - 0.90 10*3/mm3 Final     Eosinophils, Absolute   Date Value Ref Range Status   09/11/2021 0.12 0.00 - 0.40 10*3/mm3 Final     Basophils, Absolute   Date Value Ref Range Status   09/11/2021 0.07 0.00 - 0.20 10*3/mm3 Final     Immature Grans, Absolute   Date Value Ref Range Status   09/11/2021 0.03 0.00 - 0.05 10*3/mm3 Final     nRBC   Date Value Ref Range Status   09/11/2021 0.0 0.0 - 0.2 /100 WBC Final         Lab Results   Component Value Date    GLUCOSE 117 (H) 09/11/2021    BUN 8 09/11/2021    CREATININE 0.80 09/11/2021    EGFRIFNONA 78 09/11/2021    EGFRIFAFRI 89 05/08/2020    BCR 10.0 09/11/2021    K 3.9 09/11/2021    CO2 21.4 (L) 09/11/2021    CALCIUM 9.4 09/11/2021    PROTENTOTREF 7.5 05/08/2020    ALBUMIN 4.20 09/11/2021    LABIL2 1.5 05/08/2020    AST 15 09/11/2021    ALT 14 09/11/2021       Patient seen in no apparent distress.      PHYSICAL EXAM:     Foot/Ankle Exam:       General:   Appearance: appears stated age and healthy    Orientation: AAOx3    Affect: appropriate    Shoes: Walker on right foot & iWalk device.    VASCULAR      Right Foot Vascularity   Normal vascular exam    Dorsalis pedis:  2+  Posterior tibial:  2+  Skin Temperature: warm    Edema Grading:  None  CFT:  < 3 seconds  Pedal Hair Growth:  Present  Varicosities: none       Left Foot Vascularity   Normal vascular exam    Dorsalis pedis:  2+  Posterior tibial:  2+  Skin Temperature: warm    Edema Grading:  None  CFT:  < 3 seconds  Pedal Hair Growth:  Present  Varicosities: none        NEUROLOGIC     Right Foot Neurologic   Normal sensation    Light touch sensation:  Normal  Vibratory sensation:  Normal  Hot/Cold sensation: normal    Protective Sensation using Mulliken-Gena Monofilament:   10     Left Foot Neurologic   Normal sensation    Light touch sensation:  Normal  Vibratory sensation:  Normal  Hot/cold sensation: normal    Protective Sensation using Gallina-Gena Monofilament:  10     MUSCULOSKELETAL      Right Foot Musculoskeletal   Tenderness: fifth metatarsal base    Tenderness comment:  No tenderness to palpation.     MUSCLE STRENGTH     Right Foot Muscle Strength   Foot dorsiflexion:  4-  Foot plantar flexion:  4-  Foot inversion:  4-  Foot eversion:  4-     Left Foot Muscle Strength   Foot dorsiflexion:  4  Foot plantar flexion:  4  Foot inversion:  4  Foot eversion:  4     RANGE OF MOTION      Right Foot Range of Motion   Foot and ankle ROM within normal limits       Left Foot Range of Motion   Foot and ankle ROM within normal limits       DERMATOLOGIC     Right Foot Dermatologic   Skin: skin intact    Nails: normal       Left Foot Dermatologic   Skin: skin intact    Nails: normal        RADIOLOGY:      XR Foot 3+ View Right    Result Date: 8/31/2022  Narrative: IN-OFFICE IMAGING:    Standing, weightbearing, 3 view, AP, MO, Lateral, right foot Indication: Fifth metatarsal fracture follow-up Findings: No change in fracture fragments of the base of the fifth metatarsal shaft.    Improvement and increase in trabeculation seen across fracture fragments consistent with proper healing.  No gapping. Comparison: No other changes seen compared to previous views.        ASSESSMENT/PLAN     Diagnoses and all orders for this visit:    1. Foot pain, right (Primary)    2. Closed nondisplaced fracture of fifth metatarsal bone of right foot with routine healing, subsequent encounter        Comprehensive lower extremity examination and evaluation was performed.    Discussed findings and treatment plan including risks, benefits, and treatment options with patient in detail. Patient agreed with treatment plan.    Medications and allergies reviewed.  Reviewed available lab values along with other  pertinent labs.  These were discussed with the patient.    Patient may begin to weight bear as tolerated in supportive shoes.  No impact activities for one month.  After that time, the patient may increase activities as tolerated.  Patient states understanding and agreement with this plan.    An After Visit Summary was printed and given to the patient at discharge, including (if requested) any available informative/educational handouts regarding diagnosis, treatment, or medications. All questions were answered to patient/family satisfaction. Should symptoms fail to improve or worsen they agree to call or return to clinic or to go to the Emergency Department. Discussed the importance of following up with any needed screening tests/labs/specialist appointments and any requested follow-up recommended by me today. Importance of maintaining follow-up discussed and patient accepts that missed appointments can delay diagnosis and potentially lead to worsening of conditions.    Return if symptoms worsen or fail to improve., or sooner if acute issues arise.    This document has been electronically signed by Jorge A Crow DPM on August 31, 2022 13:27 EDT

## 2022-09-12 ENCOUNTER — OFFICE VISIT (OUTPATIENT)
Dept: FAMILY MEDICINE CLINIC | Facility: CLINIC | Age: 47
End: 2022-09-12

## 2022-09-12 VITALS
BODY MASS INDEX: 26.37 KG/M2 | SYSTOLIC BLOOD PRESSURE: 172 MMHG | OXYGEN SATURATION: 98 % | DIASTOLIC BLOOD PRESSURE: 105 MMHG | HEIGHT: 68 IN | HEART RATE: 97 BPM | TEMPERATURE: 98 F | WEIGHT: 174 LBS

## 2022-09-12 DIAGNOSIS — I10 PRIMARY HYPERTENSION: Primary | ICD-10-CM

## 2022-09-12 DIAGNOSIS — H53.9 VISION DISTURBANCE: ICD-10-CM

## 2022-09-12 DIAGNOSIS — F41.9 ANXIETY: ICD-10-CM

## 2022-09-12 LAB
ALBUMIN SERPL-MCNC: 4.4 G/DL (ref 3.5–5.2)
ALBUMIN/GLOB SERPL: 1.3 G/DL
ALP SERPL-CCNC: 82 U/L (ref 39–117)
ALT SERPL W P-5'-P-CCNC: 13 U/L (ref 1–33)
ANION GAP SERPL CALCULATED.3IONS-SCNC: 11.4 MMOL/L (ref 5–15)
AST SERPL-CCNC: 17 U/L (ref 1–32)
BILIRUB SERPL-MCNC: 0.3 MG/DL (ref 0–1.2)
BUN SERPL-MCNC: 8 MG/DL (ref 6–20)
BUN/CREAT SERPL: 10 (ref 7–25)
CALCIUM SPEC-SCNC: 9.5 MG/DL (ref 8.6–10.5)
CHLORIDE SERPL-SCNC: 103 MMOL/L (ref 98–107)
CHOLEST SERPL-MCNC: 176 MG/DL (ref 0–200)
CO2 SERPL-SCNC: 25.6 MMOL/L (ref 22–29)
CREAT SERPL-MCNC: 0.8 MG/DL (ref 0.57–1)
EGFRCR SERPLBLD CKD-EPI 2021: 93.3 ML/MIN/1.73
GLOBULIN UR ELPH-MCNC: 3.5 GM/DL
GLUCOSE SERPL-MCNC: 110 MG/DL (ref 65–99)
HDLC SERPL-MCNC: 50 MG/DL (ref 40–60)
LDLC SERPL CALC-MCNC: 80 MG/DL (ref 0–100)
LDLC/HDLC SERPL: 1.38 {RATIO}
POTASSIUM SERPL-SCNC: 3.9 MMOL/L (ref 3.5–5.2)
PROT SERPL-MCNC: 7.9 G/DL (ref 6–8.5)
SODIUM SERPL-SCNC: 140 MMOL/L (ref 136–145)
TRIGL SERPL-MCNC: 286 MG/DL (ref 0–150)
VLDLC SERPL-MCNC: 46 MG/DL (ref 5–40)

## 2022-09-12 PROCEDURE — 36415 COLL VENOUS BLD VENIPUNCTURE: CPT | Performed by: FAMILY MEDICINE

## 2022-09-12 PROCEDURE — 99214 OFFICE O/P EST MOD 30 MIN: CPT | Performed by: FAMILY MEDICINE

## 2022-09-12 PROCEDURE — 80053 COMPREHEN METABOLIC PANEL: CPT | Performed by: FAMILY MEDICINE

## 2022-09-12 PROCEDURE — 80061 LIPID PANEL: CPT | Performed by: FAMILY MEDICINE

## 2022-09-12 RX ORDER — IRBESARTAN 150 MG/1
150 TABLET ORAL DAILY
Qty: 90 TABLET | Refills: 0 | Status: SHIPPED | OUTPATIENT
Start: 2022-09-12 | End: 2022-12-20

## 2022-09-12 RX ORDER — ESCITALOPRAM OXALATE 10 MG/1
10 TABLET ORAL DAILY
Qty: 90 TABLET | Refills: 0 | Status: SHIPPED | OUTPATIENT
Start: 2022-09-12 | End: 2023-02-03

## 2022-09-12 NOTE — ASSESSMENT & PLAN NOTE
Some changes in her vision may be related to her uncontrolled hypertension.  These could also be ocular migraines.  With her rather elevated hypertension she needs to see the eye doctor for dilated eye exam.

## 2022-09-12 NOTE — ASSESSMENT & PLAN NOTE
Her blood pressure has been elevated at the podiatrist office and even more so here today.  Her home blood pressure readings have also been elevated.  Go ahead and start medication and get some routine baseline labs.

## 2022-09-12 NOTE — PROGRESS NOTES
Chief Complaint   Patient presents with   • Annual Exam        Subjective     Britany Pyle  has a past medical history of Broken bones (2009) and Migraine headache.    Annual exam- overall she is doing well.  She did have a fracture of her right foot 2 months ago.  She has since then seen podiatry.  She has had her blood pressure monitored at those visits and at home and it has been somewhat elevated.    Elevated blood pressure- she has been to the podiatrist for a fractured foot.  Her blood pressures been elevated there and she has been monitoring at home and has been elevated there also.  With this she has noticed some change in her vision.  She will have some per visual scintillations in her vision.  She states it is not constant.      PHQ-2 Depression Screening  Little interest or pleasure in doing things?     Feeling down, depressed, or hopeless?     PHQ-2 Total Score     PHQ-9 Depression Screening  Little interest or pleasure in doing things?     Feeling down, depressed, or hopeless?     Trouble falling or staying asleep, or sleeping too much?     Feeling tired or having little energy?     Poor appetite or overeating?     Feeling bad about yourself - or that you are a failure or have let yourself or your family down?     Trouble concentrating on things, such as reading the newspaper or watching television?     Moving or speaking so slowly that other people could have noticed? Or the opposite - being so fidgety or restless that you have been moving around a lot more than usual?     Thoughts that you would be better off dead, or of hurting yourself in some way?     PHQ-9 Total Score     If you checked off any problems, how difficult have these problems made it for you to do your work, take care of things at home, or get along with other people?       No Known Allergies    Prior to Admission medications    Medication Sig Start Date End Date Taking? Authorizing Provider   ELDERBERRY PO Take  by mouth.   Yes  Provider, MD Elizabeth   fluticasone (Flonase) 50 MCG/ACT nasal spray 2 sprays into the nostril(s) as directed by provider Daily. 12/3/21  Yes Nery Baires APRN   Multiple Vitamins-Minerals (WOMENS MULTIVITAMIN PO) Take  by mouth.   Yes ProviderElizabeth MD   etonogestrel-ethinyl estradiol (NUVARING) 0.12-0.015 MG/24HR vaginal ring Insert 1 each into the vagina. 9/7/21 9/7/22  ProviderElizabeth MD   HYDROcodone-acetaminophen (NORCO) 5-325 MG per tablet Take 1 tablet by mouth Every 4 (Four) Hours As Needed for Moderate Pain . 7/11/22 9/12/22  Gorge Olson MD        Patient Active Problem List   Diagnosis   • Abnormal uterine and vaginal bleeding, unspecified   • Uterovaginal prolapse, incomplete   • Annual physical exam   • Possible pregnancy   • Broken bones   • Migraine   • Pneumonia due to COVID-19 virus   • SOB (shortness of breath)   • Left wrist pain   • Elevated blood pressure reading   • Primary hypertension   • Vision disturbance   • Anxiety        Past Surgical History:   Procedure Laterality Date   • LEG SURGERY      right leg       Social History     Socioeconomic History   • Marital status:    Tobacco Use   • Smoking status: Never Smoker   • Smokeless tobacco: Never Used   Vaping Use   • Vaping Use: Never used   Substance and Sexual Activity   • Alcohol use: Yes     Comment: occasionally   • Drug use: Never   • Sexual activity: Not Currently       Family History   Problem Relation Age of Onset   • Hypertension Mother        Family history, surgical history, past medical history, Allergies and meds reviewed with patient today and updated in [facility] EMR.     ROS:  Review of Systems   Constitutional: Negative for fatigue.   HENT: Negative for congestion, postnasal drip and rhinorrhea.    Eyes: Positive for visual disturbance. Negative for blurred vision.   Respiratory: Negative for cough, chest tightness, shortness of breath and wheezing.    Cardiovascular: Negative for chest  "pain and palpitations.   Skin: Negative for rash.   Allergic/Immunologic: Negative for environmental allergies.   Neurological: Positive for light-headedness and headache.       OBJECTIVE:  Vitals:    09/12/22 1554   BP: (!) 172/105   BP Location: Right arm   Patient Position: Sitting   Pulse: 97   Temp: 98 °F (36.7 °C)   SpO2: 98%   Weight: 78.9 kg (174 lb)   Height: 172.7 cm (68\")     No exam data present   Body mass index is 26.46 kg/m².  No LMP recorded.    Physical Exam  Vitals and nursing note reviewed.   Constitutional:       General: She is not in acute distress.     Appearance: Normal appearance. She is normal weight.   HENT:      Head: Normocephalic.      Right Ear: Tympanic membrane, ear canal and external ear normal.      Left Ear: Tympanic membrane, ear canal and external ear normal.      Nose: Nose normal.      Mouth/Throat:      Mouth: Mucous membranes are moist.      Pharynx: Oropharynx is clear.   Eyes:      General: No scleral icterus.     Conjunctiva/sclera: Conjunctivae normal.      Pupils: Pupils are equal, round, and reactive to light.   Cardiovascular:      Rate and Rhythm: Normal rate and regular rhythm.      Pulses: Normal pulses.      Heart sounds: Normal heart sounds. No murmur heard.  Pulmonary:      Effort: Pulmonary effort is normal.      Breath sounds: Normal breath sounds. No wheezing, rhonchi or rales.   Musculoskeletal:      Cervical back: Neck supple. No rigidity or tenderness.   Lymphadenopathy:      Cervical: No cervical adenopathy.   Skin:     General: Skin is warm and dry.      Coloration: Skin is not jaundiced.      Findings: No rash.   Neurological:      General: No focal deficit present.      Mental Status: She is alert and oriented to person, place, and time.   Psychiatric:         Mood and Affect: Mood normal.         Thought Content: Thought content normal.         Judgment: Judgment normal.         Procedures    No visits with results within 30 Day(s) from this visit. "   Latest known visit with results is:   Office Visit on 02/03/2022   Component Date Value Ref Range Status   • D-Dimer, Quantitative 02/03/2022 0.73 (A) 0.00 - 0.59 mg/L (FEU) Final       ASSESSMENT/ PLAN:    Diagnoses and all orders for this visit:    1. Primary hypertension (Primary)  Assessment & Plan:  Her blood pressure has been elevated at the podiatrist office and even more so here today.  Her home blood pressure readings have also been elevated.  Go ahead and start medication and get some routine baseline labs.    Orders:  -     Comprehensive Metabolic Panel  -     Lipid Panel    2. Vision disturbance  Assessment & Plan:  Some changes in her vision may be related to her uncontrolled hypertension.  These could also be ocular migraines.  With her rather elevated hypertension she needs to see the eye doctor for dilated eye exam.      3. Anxiety  Assessment & Plan:  She has persistent anxiety.  We will go ahead and initiate medication.      Other orders  -     irbesartan (Avapro) 150 MG tablet; Take 1 tablet by mouth Daily.  Dispense: 90 tablet; Refill: 0  -     escitalopram (Lexapro) 10 MG tablet; Take 1 tablet by mouth Daily.  Dispense: 90 tablet; Refill: 0      Orders Placed Today:     New Medications Ordered This Visit   Medications   • irbesartan (Avapro) 150 MG tablet     Sig: Take 1 tablet by mouth Daily.     Dispense:  90 tablet     Refill:  0   • escitalopram (Lexapro) 10 MG tablet     Sig: Take 1 tablet by mouth Daily.     Dispense:  90 tablet     Refill:  0        Management Plan:     An After Visit Summary was printed and given to the patient at discharge.    Follow-up: Return in about 6 weeks (around 10/24/2022) for Recheck.    Leobardo Trammell DO 9/12/2022 16:19 EDT  This note was electronically signed.

## 2022-09-13 ENCOUNTER — TELEPHONE (OUTPATIENT)
Dept: FAMILY MEDICINE CLINIC | Facility: CLINIC | Age: 47
End: 2022-09-13

## 2022-09-13 NOTE — TELEPHONE ENCOUNTER
Caller: Britany Pyle    Relationship: Self    Best call back number: [phone]    What is the best time to reach you: ANY    Who are you requesting to speak with (clinical staff, provider,  specific staff member): CLINICAL STAFF    What was the call regarding: PATIENT MISSED A CALL AND IS RETURNING CALL    Do you require a callback: YES    UNABLE TO WARM TRANSFER TO PRACTICE

## 2022-10-07 ENCOUNTER — TELEPHONE (OUTPATIENT)
Dept: FAMILY MEDICINE CLINIC | Facility: CLINIC | Age: 47
End: 2022-10-07

## 2022-10-07 ENCOUNTER — OFFICE VISIT (OUTPATIENT)
Dept: FAMILY MEDICINE CLINIC | Facility: CLINIC | Age: 47
End: 2022-10-07

## 2022-10-07 ENCOUNTER — HOSPITAL ENCOUNTER (OUTPATIENT)
Dept: CARDIOLOGY | Facility: HOSPITAL | Age: 47
Discharge: HOME | End: 2022-10-07
Admitting: FAMILY MEDICINE

## 2022-10-07 VITALS
DIASTOLIC BLOOD PRESSURE: 96 MMHG | WEIGHT: 176.8 LBS | OXYGEN SATURATION: 98 % | TEMPERATURE: 98 F | HEIGHT: 68 IN | HEART RATE: 105 BPM | SYSTOLIC BLOOD PRESSURE: 139 MMHG | BODY MASS INDEX: 26.8 KG/M2

## 2022-10-07 DIAGNOSIS — I82.90 DEEP VEIN THROMBOSIS (DVT) ASSOCIATED WITH COVID-19: ICD-10-CM

## 2022-10-07 DIAGNOSIS — R60.0 LOCALIZED EDEMA: Primary | ICD-10-CM

## 2022-10-07 DIAGNOSIS — R60.0 LOCALIZED EDEMA: ICD-10-CM

## 2022-10-07 DIAGNOSIS — U07.1 DEEP VEIN THROMBOSIS (DVT) ASSOCIATED WITH COVID-19: ICD-10-CM

## 2022-10-07 LAB
BH CV LOW VAS LEFT DISTAL FEMORAL SPONT: 1
BH CV LOW VAS LEFT GASTRONEMIUS VESSEL: 1
BH CV LOW VAS LEFT MID FEMORAL SPONT: 1
BH CV LOW VAS LEFT POPLITEAL SPONT: 1
BH CV LOW VAS LEFT POSTERIOR TIBIAL VESSEL: 1
BH CV LOWER VASCULAR LEFT COMMON FEMORAL AUGMENT: NORMAL
BH CV LOWER VASCULAR LEFT COMMON FEMORAL COMPETENT: NORMAL
BH CV LOWER VASCULAR LEFT COMMON FEMORAL COMPRESS: NORMAL
BH CV LOWER VASCULAR LEFT COMMON FEMORAL PHASIC: NORMAL
BH CV LOWER VASCULAR LEFT COMMON FEMORAL SPONT: NORMAL
BH CV LOWER VASCULAR LEFT DISTAL FEMORAL AUGMENT: NORMAL
BH CV LOWER VASCULAR LEFT DISTAL FEMORAL COMPETENT: NORMAL
BH CV LOWER VASCULAR LEFT DISTAL FEMORAL COMPRESS: NORMAL
BH CV LOWER VASCULAR LEFT DISTAL FEMORAL PHASIC: NORMAL
BH CV LOWER VASCULAR LEFT DISTAL FEMORAL SPONT: NORMAL
BH CV LOWER VASCULAR LEFT DISTAL FEMORAL THROMBUS: NORMAL
BH CV LOWER VASCULAR LEFT GASTRONEMIUS COMPRESS: NORMAL
BH CV LOWER VASCULAR LEFT GASTRONEMIUS THROMBUS: NORMAL
BH CV LOWER VASCULAR LEFT GREATER SAPH AK COMPRESS: NORMAL
BH CV LOWER VASCULAR LEFT GREATER SAPH BK COMPRESS: NORMAL
BH CV LOWER VASCULAR LEFT LESSER SAPH COMPRESS: NORMAL
BH CV LOWER VASCULAR LEFT MID FEMORAL AUGMENT: NORMAL
BH CV LOWER VASCULAR LEFT MID FEMORAL COMPETENT: NORMAL
BH CV LOWER VASCULAR LEFT MID FEMORAL COMPRESS: NORMAL
BH CV LOWER VASCULAR LEFT MID FEMORAL PHASIC: NORMAL
BH CV LOWER VASCULAR LEFT MID FEMORAL SPONT: NORMAL
BH CV LOWER VASCULAR LEFT MID FEMORAL THROMBUS: NORMAL
BH CV LOWER VASCULAR LEFT PERONEAL COMPRESS: NORMAL
BH CV LOWER VASCULAR LEFT POPLITEAL AUGMENT: NORMAL
BH CV LOWER VASCULAR LEFT POPLITEAL COMPETENT: NORMAL
BH CV LOWER VASCULAR LEFT POPLITEAL COMPRESS: NORMAL
BH CV LOWER VASCULAR LEFT POPLITEAL PHASIC: NORMAL
BH CV LOWER VASCULAR LEFT POPLITEAL SPONT: NORMAL
BH CV LOWER VASCULAR LEFT POPLITEAL THROMBUS: NORMAL
BH CV LOWER VASCULAR LEFT POSTERIOR TIBIAL COMPRESS: NORMAL
BH CV LOWER VASCULAR LEFT POSTERIOR TIBIAL THROMBUS: NORMAL
BH CV LOWER VASCULAR LEFT PROXIMAL FEMORAL COMPRESS: NORMAL
BH CV LOWER VASCULAR LEFT SAPHENOFEMORAL JUNCTION COMPRESS: NORMAL
BH CV LOWER VASCULAR RIGHT COMMON FEMORAL AUGMENT: NORMAL
BH CV LOWER VASCULAR RIGHT COMMON FEMORAL COMPETENT: NORMAL
BH CV LOWER VASCULAR RIGHT COMMON FEMORAL COMPRESS: NORMAL
BH CV LOWER VASCULAR RIGHT COMMON FEMORAL PHASIC: NORMAL
BH CV LOWER VASCULAR RIGHT COMMON FEMORAL SPONT: NORMAL
MAXIMAL PREDICTED HEART RATE: 176 BPM
STRESS TARGET HR: 150 BPM

## 2022-10-07 PROCEDURE — 93971 EXTREMITY STUDY: CPT | Performed by: SURGERY

## 2022-10-07 PROCEDURE — 99213 OFFICE O/P EST LOW 20 MIN: CPT | Performed by: FAMILY MEDICINE

## 2022-10-07 PROCEDURE — 93971 EXTREMITY STUDY: CPT

## 2022-10-07 NOTE — TELEPHONE ENCOUNTER
Patient called and stated she started a new BP med this week. She is thinking her symptoms are from this new med. Stated her left leg is swelling and feels like a virginia horse, patient states she is afraid of a blood clot. Patient stated she is having SOB all week, more than normal.   Spoke with Dr. Trammell, told to put on schedule. Being seen at 9:30 today.

## 2022-10-07 NOTE — ASSESSMENT & PLAN NOTE
Received a call from the vascular center that her study was positive for DVT.  Do not have the formalize results of that test yet.  We will go ahead and start her on Eliquis on a regular basis.

## 2022-10-07 NOTE — TELEPHONE ENCOUNTER
The medication would be highly unlikely to cause a blood clot.  But if her leg is swollen and painful yes she does need to be seen today.  I see that she does have an appointment at 930.

## 2022-10-07 NOTE — ASSESSMENT & PLAN NOTE
With your acute onset of swelling and pain in her leg we will get a venous Doppler to rule out DVT.

## 2022-10-07 NOTE — PROGRESS NOTES
Chief Complaint   Patient presents with   • Leg Swelling     left        Subjective     Britany Pyle  has a past medical history of Broken bones (2009) and Migraine headache.    Swelling leg- she has noticed some pain in her left lower leg this week but in the last 2 or 3 days it has become swollen.  He denies any recent trauma or injury to her leg.  Is also been somewhat short of breath as well.      PHQ-2 Depression Screening  Little interest or pleasure in doing things?     Feeling down, depressed, or hopeless?     PHQ-2 Total Score     PHQ-9 Depression Screening  Little interest or pleasure in doing things?     Feeling down, depressed, or hopeless?     Trouble falling or staying asleep, or sleeping too much?     Feeling tired or having little energy?     Poor appetite or overeating?     Feeling bad about yourself - or that you are a failure or have let yourself or your family down?     Trouble concentrating on things, such as reading the newspaper or watching television?     Moving or speaking so slowly that other people could have noticed? Or the opposite - being so fidgety or restless that you have been moving around a lot more than usual?     Thoughts that you would be better off dead, or of hurting yourself in some way?     PHQ-9 Total Score     If you checked off any problems, how difficult have these problems made it for you to do your work, take care of things at home, or get along with other people?       No Known Allergies    Prior to Admission medications    Medication Sig Start Date End Date Taking? Authorizing Provider   TARIK PO Take  by mouth.   Yes Provider, MD Elizabeth   escitalopram (Lexapro) 10 MG tablet Take 1 tablet by mouth Daily. 9/12/22  Yes Leobardo Trammell, DO   fluticasone (Flonase) 50 MCG/ACT nasal spray 2 sprays into the nostril(s) as directed by provider Daily. 12/3/21  Yes Nery Baires APRN   irbesartan (Avapro) 150 MG tablet Take 1 tablet by mouth Daily.  "9/12/22  Yes Leobardo Trammell, DO   Multiple Vitamins-Minerals (WOMENS MULTIVITAMIN PO) Take  by mouth.   Yes ProviderElizabeth MD   etonogestrel-ethinyl estradiol (NUVARING) 0.12-0.015 MG/24HR vaginal ring Insert 1 each into the vagina. 9/7/21 9/7/22  ProviderElizabeth MD        Patient Active Problem List   Diagnosis   • Abnormal uterine and vaginal bleeding, unspecified   • Uterovaginal prolapse, incomplete   • Annual physical exam   • Possible pregnancy   • Broken bones   • Migraine   • Pneumonia due to COVID-19 virus   • SOB (shortness of breath)   • Left wrist pain   • Elevated blood pressure reading   • Primary hypertension   • Vision disturbance   • Anxiety   • Localized edema        Past Surgical History:   Procedure Laterality Date   • LEG SURGERY      right leg       Social History     Socioeconomic History   • Marital status:    Tobacco Use   • Smoking status: Never Smoker   • Smokeless tobacco: Never Used   Vaping Use   • Vaping Use: Never used   Substance and Sexual Activity   • Alcohol use: Yes     Comment: occasionally   • Drug use: Never   • Sexual activity: Not Currently       Family History   Problem Relation Age of Onset   • Hypertension Mother        Family history, surgical history, past medical history, Allergies and meds reviewed with patient today and updated in [employer] EMR.     ROS:  Review of Systems   Constitutional: Negative for fatigue.   Respiratory: Positive for shortness of breath. Negative for cough, chest tightness and wheezing.    Cardiovascular: Positive for chest pain and leg swelling (Left leg). Negative for palpitations.       OBJECTIVE:  Vitals:    10/07/22 0931   BP: 139/96   BP Location: Right arm   Patient Position: Sitting   Pulse: 105   Temp: 98 °F (36.7 °C)   SpO2: 98%   Weight: 80.2 kg (176 lb 12.8 oz)   Height: 172.7 cm (68\")     No exam data present   Body mass index is 26.88 kg/m².  No LMP recorded (lmp unknown).    Physical Exam  Vitals and " nursing note reviewed.   Constitutional:       General: She is not in acute distress.     Appearance: Normal appearance. She is normal weight.   HENT:      Head: Normocephalic.   Cardiovascular:      Rate and Rhythm: Normal rate and regular rhythm.      Heart sounds: Normal heart sounds. No murmur heard.  Pulmonary:      Effort: Pulmonary effort is normal.      Breath sounds: Normal breath sounds. No wheezing, rhonchi or rales.   Musculoskeletal:      Right lower leg: No edema.      Left lower le+ Edema present.   Neurological:      Mental Status: She is alert.         Procedures    Office Visit on 2022   Component Date Value Ref Range Status   • Glucose 2022 110 (A) 65 - 99 mg/dL Final   • BUN 2022 8  6 - 20 mg/dL Final   • Creatinine 2022 0.80  0.57 - 1.00 mg/dL Final   • Sodium 2022 140  136 - 145 mmol/L Final   • Potassium 2022 3.9  3.5 - 5.2 mmol/L Final   • Chloride 2022 103  98 - 107 mmol/L Final   • CO2 2022 25.6  22.0 - 29.0 mmol/L Final   • Calcium 2022 9.5  8.6 - 10.5 mg/dL Final   • Total Protein 2022 7.9  6.0 - 8.5 g/dL Final   • Albumin 2022 4.40  3.50 - 5.20 g/dL Final   • ALT (SGPT) 2022 13  1 - 33 U/L Final   • AST (SGOT) 2022 17  1 - 32 U/L Final   • Alkaline Phosphatase 2022 82  39 - 117 U/L Final   • Total Bilirubin 2022 0.3  0.0 - 1.2 mg/dL Final   • Globulin 2022 3.5  gm/dL Final   • A/G Ratio 2022 1.3  g/dL Final   • BUN/Creatinine Ratio 2022 10.0  7.0 - 25.0 Final   • Anion Gap 2022 11.4  5.0 - 15.0 mmol/L Final   • eGFR 2022 93.3  >60.0 mL/min/1.73 Final    National Kidney Foundation and American Society of Nephrology (ASN) Task Force recommended calculation based on the Chronic Kidney Disease Epidemiology Collaboration (CKD-EPI) equation refit without adjustment for race.   • Total Cholesterol 2022 176  0 - 200 mg/dL Final   • Triglycerides 2022 286 (A) 0 -  150 mg/dL Final   • HDL Cholesterol 09/12/2022 50  40 - 60 mg/dL Final   • LDL Cholesterol  09/12/2022 80  0 - 100 mg/dL Final   • VLDL Cholesterol 09/12/2022 46 (A) 5 - 40 mg/dL Final   • LDL/HDL Ratio 09/12/2022 1.38   Final       ASSESSMENT/ PLAN:    Diagnoses and all orders for this visit:    1. Localized edema (Primary)  Assessment & Plan:  With your acute onset of swelling and pain in her leg we will get a venous Doppler to rule out DVT.    Orders:  -     Duplex Venous Lower Extremity - Left CAR; Future      Orders Placed Today:     No orders of the defined types were placed in this encounter.       Management Plan:     An After Visit Summary was printed and given to the patient at discharge.    Follow-up: Return for f/u after imaging.    Leobardo Trammell DO 10/7/2022 09:57 EDT  This note was electronically signed.

## 2022-10-10 ENCOUNTER — TELEPHONE (OUTPATIENT)
Dept: FAMILY MEDICINE CLINIC | Facility: CLINIC | Age: 47
End: 2022-10-10

## 2022-10-10 NOTE — TELEPHONE ENCOUNTER
Caller: Britany Pyle    Relationship: Self    Best call back number: [phone]    Requested Prescriptions:   Requested Prescriptions     Pending Prescriptions Disp Refills   • apixaban (ELIQUIS) 5 MG tablet tablet 60 tablet 5     Sig: Take 1 tablet by mouth 2 (Two) Times a Day.        Pharmacy where request should be sent: [id number] [place]  [address] AT [facility] [address] - [phone] [facility] [phone] FX     Additional details provided by patient: PATIENT NEEDS A PRIOR AUTHORIZATION DONE FOR THIS MEDICATION ACCORDING TO THE PHARMACY. PATIENT HAS A 3 AND A HALF DAY SUPPLY LEFT. THE MEDICATION WILL COST  DOLLARS A MONTH IF THIS PRIOR AUTHORIZATION DOES NOT GIVE APPROVED. PATIENT WOULD LIKE TO HAVE GENERIC CALLED IN IF THIS IS NOT POSSIBLE. PLEASE SUBMIT NEW PRESCRIPTION INTO THE PHARMACY ASAP OR SUBMIT PA TO INSURANCE ASAP. PLEASE CALL PATIENT BACK AND LET HER KNOW IF SHE CAN STOP BY AND  MORE SAMPLES UNTIL THIS IS RESOLVED.    Does the patient have less than a 3 day supply:  [] Yes  [x] No    Glendy Aguirre Rep   10/10/22 14:04 EDT

## 2022-10-27 ENCOUNTER — OFFICE VISIT (OUTPATIENT)
Dept: FAMILY MEDICINE CLINIC | Facility: CLINIC | Age: 47
End: 2022-10-27

## 2022-10-27 VITALS
HEART RATE: 95 BPM | HEIGHT: 68 IN | BODY MASS INDEX: 27.43 KG/M2 | SYSTOLIC BLOOD PRESSURE: 129 MMHG | TEMPERATURE: 98.2 F | OXYGEN SATURATION: 97 % | WEIGHT: 181 LBS | DIASTOLIC BLOOD PRESSURE: 84 MMHG

## 2022-10-27 DIAGNOSIS — U07.1 DEEP VEIN THROMBOSIS (DVT) ASSOCIATED WITH COVID-19: Primary | ICD-10-CM

## 2022-10-27 DIAGNOSIS — I82.90 DEEP VEIN THROMBOSIS (DVT) ASSOCIATED WITH COVID-19: Primary | ICD-10-CM

## 2022-10-27 PROCEDURE — 99213 OFFICE O/P EST LOW 20 MIN: CPT | Performed by: FAMILY MEDICINE

## 2022-10-27 RX ORDER — ETONOGESTREL AND ETHINYL ESTRADIOL 11.7; 2.7 MG/1; MG/1
INSERT, EXTENDED RELEASE VAGINAL
COMMUNITY
Start: 2022-10-10 | End: 2023-02-03

## 2022-10-27 NOTE — ASSESSMENT & PLAN NOTE
Currently she is doing well with the Eliquis.  She will continue this for a total of 6 months and then reevaluate and discuss discontinuation at that time.  She will continue activity as tolerated elevation and wearing her compression hose.  Her left lower leg will always from this point swell more and more easily than he did prior to this event.

## 2022-10-27 NOTE — PROGRESS NOTES
Chief Complaint   Patient presents with   • Follow-up     6 week  blood clot         Subjective     Britany Pyle  has a past medical history of Broken bones (2009) and Migraine headache.    DVT- is taking her Eliquis twice daily.  She has not had any nosebleeds gross hematuria or bloody bowel movements.  She states her legs is feeling better in the last 3 weeks she really has not had hardly any pain unless she does have excess amount of activity.  She has also been wearing a compression sock on that leg also.  She has not had any chest pain tightness heaviness or shortness of breath.      PHQ-2 Depression Screening  Little interest or pleasure in doing things?     Feeling down, depressed, or hopeless?     PHQ-2 Total Score     PHQ-9 Depression Screening  Little interest or pleasure in doing things?     Feeling down, depressed, or hopeless?     Trouble falling or staying asleep, or sleeping too much?     Feeling tired or having little energy?     Poor appetite or overeating?     Feeling bad about yourself - or that you are a failure or have let yourself or your family down?     Trouble concentrating on things, such as reading the newspaper or watching television?     Moving or speaking so slowly that other people could have noticed? Or the opposite - being so fidgety or restless that you have been moving around a lot more than usual?     Thoughts that you would be better off dead, or of hurting yourself in some way?     PHQ-9 Total Score     If you checked off any problems, how difficult have these problems made it for you to do your work, take care of things at home, or get along with other people?       No Known Allergies    Prior to Admission medications    Medication Sig Start Date End Date Taking? Authorizing Provider   apixaban (ELIQUIS) 5 MG tablet tablet Take 1 tablet by mouth 2 (Two) Times a Day. 10/7/22  Yes Leobardo Trammell, DO   ELDERBERRY PO Take  by mouth.   Yes Provider, MD Elizabeth    escitalopram (Lexapro) 10 MG tablet Take 1 tablet by mouth Daily. 9/12/22  Yes Leobardo Trammell, DO   etonogestrel-ethinyl estradiol (NUVARING) 0.12-0.015 MG/24HR vaginal ring INSERT 1 RING VAGINALLY FOR 21 DAYS, THEN REMOVE FOR 7 DAYS 10/10/22  Yes ProviderElizabeth MD   fluticasone (Flonase) 50 MCG/ACT nasal spray 2 sprays into the nostril(s) as directed by provider Daily. 12/3/21  Yes Nery Baires APRN   irbesartan (Avapro) 150 MG tablet Take 1 tablet by mouth Daily. 9/12/22  Yes Leobardo Trammell, DO   Multiple Vitamins-Minerals (WOMENS MULTIVITAMIN PO) Take  by mouth.   Yes ProviderElizabeth MD   etonogestrel-ethinyl estradiol (NUVARING) 0.12-0.015 MG/24HR vaginal ring Insert 1 each into the vagina. 9/7/21 9/7/22  ProviderElizabeth MD        Patient Active Problem List   Diagnosis   • Abnormal uterine and vaginal bleeding, unspecified   • Uterovaginal prolapse, incomplete   • Annual physical exam   • Possible pregnancy   • Broken bones   • Migraine   • Pneumonia due to COVID-19 virus   • SOB (shortness of breath)   • Left wrist pain   • Elevated blood pressure reading   • Primary hypertension   • Vision disturbance   • Anxiety   • Localized edema   • Deep vein thrombosis (DVT) associated with COVID-19        Past Surgical History:   Procedure Laterality Date   • LEG SURGERY      right leg       Social History     Socioeconomic History   • Marital status:    Tobacco Use   • Smoking status: Never   • Smokeless tobacco: Never   Vaping Use   • Vaping Use: Never used   Substance and Sexual Activity   • Alcohol use: Yes     Comment: occasionally   • Drug use: Never   • Sexual activity: Not Currently       Family History   Problem Relation Age of Onset   • Hypertension Mother        Family history, surgical history, past medical history, Allergies and meds reviewed with patient today and updated in [employer] EMR.     ROS:  Review of Systems   Constitutional: Negative for fatigue.  "  HENT: Negative for nosebleeds.    Respiratory: Negative for cough, chest tightness, shortness of breath and wheezing.    Cardiovascular: Positive for leg swelling. Negative for chest pain and palpitations.   Gastrointestinal: Negative for blood in stool.   Genitourinary: Negative for hematuria.   Hematological: Does not bruise/bleed easily.       OBJECTIVE:  Vitals:    10/27/22 1631   BP: 129/84   BP Location: Left arm   Patient Position: Sitting   Pulse: 95   Temp: 98.2 °F (36.8 °C)   SpO2: 97%   Weight: 82.1 kg (181 lb)   Height: 172.7 cm (68\")     No results found.   Body mass index is 27.52 kg/m².  No LMP recorded.    Physical Exam  Vitals and nursing note reviewed.   Constitutional:       General: She is not in acute distress.     Appearance: Normal appearance. She is normal weight.   HENT:      Head: Normocephalic.   Cardiovascular:      Rate and Rhythm: Normal rate and regular rhythm.      Heart sounds: Normal heart sounds. No murmur heard.  Pulmonary:      Effort: Pulmonary effort is normal.      Breath sounds: Normal breath sounds. No wheezing, rhonchi or rales.   Musculoskeletal:      Right lower leg: No edema.      Left lower leg: Edema (trace) present.   Neurological:      Mental Status: She is alert.         Procedures    Hospital Outpatient Visit on 10/07/2022   Component Date Value Ref Range Status   • Target HR (85%) 10/07/2022 150  bpm Final   • Max. Pred. HR (100%) 10/07/2022 176  bpm Final   • Left Mid Femoral Spont 10/07/2022 1   Final   • Left Distal Femoral Spont 10/07/2022 1   Final   • Left Popliteal Spont 10/07/2022 1   Final   • Left Posterior Tibial Vessel 10/07/2022 1   Final   • Left Gastronemius Vessel 10/07/2022 1   Final   • Right Common Femoral Spont 10/07/2022 Y   Final   • Right Common Femoral Phasic 10/07/2022 Y   Final   • Right Common Femoral Augment 10/07/2022 Y   Final   • Right Common Femoral Competent 10/07/2022 Y   Final   • Right Common Femoral Compress 10/07/2022 C   " Final   • Left Common Femoral Spont 10/07/2022 Y   Final   • Left Common Femoral Phasic 10/07/2022 Y   Final   • Left Common Femoral Augment 10/07/2022 Y   Final   • Left Common Femoral Competent 10/07/2022 Y   Final   • Left Common Femoral Compress 10/07/2022 C   Final   • Left Saphenofemoral Junction Compr* 10/07/2022 C   Final   • Left Proximal Femoral Compress 10/07/2022 C   Final   • Left Mid Femoral Spont 10/07/2022 N   Final   • Left Mid Femoral Phasic 10/07/2022 N   Final   • Left Mid Femoral Augment 10/07/2022 N   Final   • Left Mid Femoral Competent 10/07/2022 N   Final   • Left Mid Femoral Compress 10/07/2022 N   Final   • Left Mid Femoral Thrombus 10/07/2022 A   Final   • Left Distal Femoral Spont 10/07/2022 N   Final   • Left Distal Femoral Phasic 10/07/2022 N   Final   • Left Distal Femoral Augment 10/07/2022 N   Final   • Left Distal Femoral Competent 10/07/2022 N   Final   • Left Distal Femoral Compress 10/07/2022 N   Final   • Left Distal Femoral Thrombus 10/07/2022 A   Final   • Left Popliteal Spont 10/07/2022 N   Final   • Left Popliteal Phasic 10/07/2022 N   Final   • Left Popliteal Augment 10/07/2022 N   Final   • Left Popliteal Competent 10/07/2022 N   Final   • Left Popliteal Compress 10/07/2022 N   Final   • Left Popliteal Thrombus 10/07/2022 A   Final   • Left Posterior Tibial Compress 10/07/2022 N   Final   • Left Posterior Tibial Thrombus 10/07/2022 A   Final   • Left Peroneal Compress 10/07/2022 C   Final   • Left Gastronemius Compress 10/07/2022 N   Final   • Left Gastronemius Thrombus 10/07/2022 A   Final   • Left Greater Saph AK Compress 10/07/2022 C   Final   • Left Greater Saph BK Compress 10/07/2022 C   Final   • Left Lesser Saph Compress 10/07/2022 C   Final       ASSESSMENT/ PLAN:    There are no diagnoses linked to this encounter.    Orders Placed Today:     No orders of the defined types were placed in this encounter.       Management Plan:     An After Visit Summary was printed  and given to the patient at discharge.    Follow-up: Return in about 4 months (around 2/27/2023) for Recheck.    Leobardo Trammell,  10/27/2022 17:12 EDT  This note was electronically signed.

## 2022-12-20 RX ORDER — IRBESARTAN 150 MG/1
TABLET ORAL
Qty: 90 TABLET | Refills: 0 | Status: SHIPPED | OUTPATIENT
Start: 2022-12-20 | End: 2023-02-03

## 2023-01-10 ENCOUNTER — TRANSCRIBE ORDERS (OUTPATIENT)
Dept: ADMINISTRATIVE | Facility: HOSPITAL | Age: 48
End: 2023-01-10
Payer: COMMERCIAL

## 2023-01-10 DIAGNOSIS — Z12.31 SCREENING MAMMOGRAM FOR HIGH-RISK PATIENT: Primary | ICD-10-CM

## 2023-02-03 ENCOUNTER — OFFICE VISIT (OUTPATIENT)
Dept: INTERNAL MEDICINE | Facility: CLINIC | Age: 48
End: 2023-02-03
Payer: COMMERCIAL

## 2023-02-03 VITALS
BODY MASS INDEX: 28.13 KG/M2 | DIASTOLIC BLOOD PRESSURE: 94 MMHG | SYSTOLIC BLOOD PRESSURE: 142 MMHG | TEMPERATURE: 98.2 F | HEIGHT: 68 IN | HEART RATE: 82 BPM | OXYGEN SATURATION: 98 % | WEIGHT: 185.6 LBS

## 2023-02-03 DIAGNOSIS — Z12.11 SCREENING FOR COLON CANCER: ICD-10-CM

## 2023-02-03 DIAGNOSIS — I10 PRIMARY HYPERTENSION: Primary | Chronic | ICD-10-CM

## 2023-02-03 DIAGNOSIS — F41.1 GAD (GENERALIZED ANXIETY DISORDER): ICD-10-CM

## 2023-02-03 DIAGNOSIS — H53.9 VISION CHANGES: ICD-10-CM

## 2023-02-03 DIAGNOSIS — Z13.220 SCREENING FOR LIPID DISORDERS: ICD-10-CM

## 2023-02-03 DIAGNOSIS — Z86.718 PERSONAL HISTORY OF DVT (DEEP VEIN THROMBOSIS): ICD-10-CM

## 2023-02-03 PROBLEM — Z71.89 ENCOUNTER FOR SURGICAL COUNSELING: Status: ACTIVE | Noted: 2023-01-09

## 2023-02-03 LAB
BASOPHILS # BLD AUTO: 0.06 10*3/MM3 (ref 0–0.2)
BASOPHILS NFR BLD AUTO: 0.8 % (ref 0–1.5)
DEPRECATED RDW RBC AUTO: 38.9 FL (ref 37–54)
EOSINOPHIL # BLD AUTO: 0.07 10*3/MM3 (ref 0–0.4)
EOSINOPHIL NFR BLD AUTO: 0.9 % (ref 0.3–6.2)
ERYTHROCYTE [DISTWIDTH] IN BLOOD BY AUTOMATED COUNT: 12.2 % (ref 12.3–15.4)
HCT VFR BLD AUTO: 37 % (ref 34–46.6)
HGB BLD-MCNC: 12.5 G/DL (ref 12–15.9)
IMM GRANULOCYTES # BLD AUTO: 0.01 10*3/MM3 (ref 0–0.05)
IMM GRANULOCYTES NFR BLD AUTO: 0.1 % (ref 0–0.5)
LYMPHOCYTES # BLD AUTO: 1.88 10*3/MM3 (ref 0.7–3.1)
LYMPHOCYTES NFR BLD AUTO: 25.1 % (ref 19.6–45.3)
MCH RBC QN AUTO: 29.8 PG (ref 26.6–33)
MCHC RBC AUTO-ENTMCNC: 33.8 G/DL (ref 31.5–35.7)
MCV RBC AUTO: 88.1 FL (ref 79–97)
MONOCYTES # BLD AUTO: 0.52 10*3/MM3 (ref 0.1–0.9)
MONOCYTES NFR BLD AUTO: 7 % (ref 5–12)
NEUTROPHILS NFR BLD AUTO: 4.94 10*3/MM3 (ref 1.7–7)
NEUTROPHILS NFR BLD AUTO: 66.1 % (ref 42.7–76)
NRBC BLD AUTO-RTO: 0 /100 WBC (ref 0–0.2)
PLATELET # BLD AUTO: 314 10*3/MM3 (ref 140–450)
PMV BLD AUTO: 10.4 FL (ref 6–12)
RBC # BLD AUTO: 4.2 10*6/MM3 (ref 3.77–5.28)
WBC NRBC COR # BLD: 7.48 10*3/MM3 (ref 3.4–10.8)

## 2023-02-03 PROCEDURE — 80061 LIPID PANEL: CPT | Performed by: NURSE PRACTITIONER

## 2023-02-03 PROCEDURE — 80050 GENERAL HEALTH PANEL: CPT | Performed by: NURSE PRACTITIONER

## 2023-02-03 PROCEDURE — 99215 OFFICE O/P EST HI 40 MIN: CPT | Performed by: NURSE PRACTITIONER

## 2023-02-03 RX ORDER — BUSPIRONE HYDROCHLORIDE 5 MG/1
5 TABLET ORAL 3 TIMES DAILY
Qty: 90 TABLET | Refills: 1 | Status: SHIPPED | OUTPATIENT
Start: 2023-02-03

## 2023-02-03 RX ORDER — LISINOPRIL 2.5 MG/1
2.5 TABLET ORAL DAILY
Qty: 30 TABLET | Refills: 1 | Status: SHIPPED | OUTPATIENT
Start: 2023-02-03 | End: 2023-02-03

## 2023-02-03 RX ORDER — LISINOPRIL 2.5 MG/1
2.5 TABLET ORAL DAILY
Qty: 30 TABLET | Refills: 1 | Status: SHIPPED | OUTPATIENT
Start: 2023-02-03 | End: 2023-03-09 | Stop reason: SDUPTHER

## 2023-02-03 RX ORDER — SPIRONOLACTONE AND HYDROCHLOROTHIAZIDE 25; 25 MG/1; MG/1
1 TABLET ORAL DAILY
COMMUNITY
Start: 2023-01-12 | End: 2023-02-03

## 2023-02-03 RX ORDER — BUSPIRONE HYDROCHLORIDE 5 MG/1
5 TABLET ORAL 3 TIMES DAILY
Qty: 90 TABLET | Refills: 1 | Status: SHIPPED | OUTPATIENT
Start: 2023-02-03 | End: 2023-02-03

## 2023-02-04 LAB
ALBUMIN SERPL-MCNC: 4.5 G/DL (ref 3.5–5.2)
ALBUMIN/GLOB SERPL: 1.4 G/DL
ALP SERPL-CCNC: 80 U/L (ref 39–117)
ALT SERPL W P-5'-P-CCNC: 19 U/L (ref 1–33)
ANION GAP SERPL CALCULATED.3IONS-SCNC: 8.7 MMOL/L (ref 5–15)
AST SERPL-CCNC: 16 U/L (ref 1–32)
BILIRUB SERPL-MCNC: 0.4 MG/DL (ref 0–1.2)
BUN SERPL-MCNC: 9 MG/DL (ref 6–20)
BUN/CREAT SERPL: 10.5 (ref 7–25)
CALCIUM SPEC-SCNC: 9.7 MG/DL (ref 8.6–10.5)
CHLORIDE SERPL-SCNC: 102 MMOL/L (ref 98–107)
CHOLEST SERPL-MCNC: 186 MG/DL (ref 0–200)
CO2 SERPL-SCNC: 27.3 MMOL/L (ref 22–29)
CREAT SERPL-MCNC: 0.86 MG/DL (ref 0.57–1)
EGFRCR SERPLBLD CKD-EPI 2021: 85 ML/MIN/1.73
GLOBULIN UR ELPH-MCNC: 3.2 GM/DL
GLUCOSE SERPL-MCNC: 106 MG/DL (ref 65–99)
HDLC SERPL-MCNC: 42 MG/DL (ref 40–60)
LDLC SERPL CALC-MCNC: 122 MG/DL (ref 0–100)
LDLC/HDLC SERPL: 2.84 {RATIO}
POTASSIUM SERPL-SCNC: 4.3 MMOL/L (ref 3.5–5.2)
PROT SERPL-MCNC: 7.7 G/DL (ref 6–8.5)
SODIUM SERPL-SCNC: 138 MMOL/L (ref 136–145)
TRIGL SERPL-MCNC: 124 MG/DL (ref 0–150)
TSH SERPL DL<=0.05 MIU/L-ACNC: 2.6 UIU/ML (ref 0.27–4.2)
VLDLC SERPL-MCNC: 22 MG/DL (ref 5–40)

## 2023-02-07 NOTE — ASSESSMENT & PLAN NOTE
Psychological condition is stable .  Continue current treatment regimen.  Regular aerobic exercise.  Psychological condition  will be reassessed in 4 weeks.

## 2023-02-07 NOTE — ASSESSMENT & PLAN NOTE
Hypertension is newly identified.  Dietary sodium restriction.  Weight loss.  Regular aerobic exercise.  Medication changes per orders.  Blood pressure will be reassessed in 4 weeks.    Stop duel diuretic and irbesartan.   Will start low dose lisinopril and do close follow up for dose increase

## 2023-02-09 ENCOUNTER — TELEPHONE (OUTPATIENT)
Dept: INTERNAL MEDICINE | Facility: CLINIC | Age: 48
End: 2023-02-09

## 2023-02-09 NOTE — TELEPHONE ENCOUNTER
Caller: Britany Pyle    Relationship to patient: Self    Best call back number: [phone]    Patient is needing: PATIENT CALLED IN STATING SHE WOULD LIKE AN UPDATE ON HER REFERRALS THAT WERE PUT IN ON 2.3.23 A MESSAGE CAN BE LEFT IF NO ANSWER.

## 2023-02-10 ENCOUNTER — TELEPHONE (OUTPATIENT)
Dept: INTERNAL MEDICINE | Facility: CLINIC | Age: 48
End: 2023-02-10
Payer: COMMERCIAL

## 2023-02-10 NOTE — TELEPHONE ENCOUNTER
SPOKE TO PT. LET HER KNOW THE STATUS OF HER REFERRALS. LET HER KNOW THAT IF SHE DOES NOT HEAR FROM THE SCHEDULING HUB TO SCHEDULE HER IMAGING SHE CAN CALL BE BACK NEXT WEEK SO THAT I CAN TRY TO SCHEDULE IT FOR HER

## 2023-02-14 ENCOUNTER — TELEPHONE (OUTPATIENT)
Dept: INTERNAL MEDICINE | Facility: CLINIC | Age: 48
End: 2023-02-14
Payer: COMMERCIAL

## 2023-02-16 ENCOUNTER — TELEPHONE (OUTPATIENT)
Dept: INTERNAL MEDICINE | Facility: CLINIC | Age: 48
End: 2023-02-16
Payer: COMMERCIAL

## 2023-02-16 NOTE — TELEPHONE ENCOUNTER
Caller: Britany Pyle    Relationship to patient: Self    Best call back number: [phone]    Patient is needing:PATIENT STATED THAT ALAINA IS NEEDING MEDICATION PRIOR AUTHORIZATION.    [id number] - [place], [place] - [address] AT [facility] [phone] [facility] [phone]

## 2023-02-21 NOTE — TELEPHONE ENCOUNTER
Approved on February 20    CaseId:[id number];  Status:Approved;  Review Type:Prior Auth;    Coverage Start Date:01/21/2023;  Coverage End Date:02/20/2024;

## 2023-03-02 DIAGNOSIS — I10 PRIMARY HYPERTENSION: Chronic | ICD-10-CM

## 2023-03-02 RX ORDER — LISINOPRIL 2.5 MG/1
2.5 TABLET ORAL DAILY
Qty: 90 TABLET | OUTPATIENT
Start: 2023-03-02

## 2023-03-02 NOTE — TELEPHONE ENCOUNTER
Patient has blood pressure follow-up on 03/09/2023.  Denied refill due to possible need to increase patient's dose.

## 2023-03-08 ENCOUNTER — HOSPITAL ENCOUNTER (OUTPATIENT)
Dept: MRI IMAGING | Facility: HOSPITAL | Age: 48
Discharge: HOME | End: 2023-03-08
Payer: COMMERCIAL

## 2023-03-08 ENCOUNTER — HOSPITAL ENCOUNTER (OUTPATIENT)
Dept: CARDIOLOGY | Facility: HOSPITAL | Age: 48
Discharge: HOME | End: 2023-03-08
Payer: COMMERCIAL

## 2023-03-08 DIAGNOSIS — Z86.718 PERSONAL HISTORY OF DVT (DEEP VEIN THROMBOSIS): ICD-10-CM

## 2023-03-08 DIAGNOSIS — H53.9 VISION CHANGES: ICD-10-CM

## 2023-03-08 LAB
BH CV LOW VAS LEFT DISTAL FEMORAL SPONT: 1
BH CV LOW VAS LEFT POPLITEAL SPONT: 1
BH CV LOWER VASCULAR LEFT COMMON FEMORAL AUGMENT: NORMAL
BH CV LOWER VASCULAR LEFT COMMON FEMORAL COMPETENT: NORMAL
BH CV LOWER VASCULAR LEFT COMMON FEMORAL COMPRESS: NORMAL
BH CV LOWER VASCULAR LEFT COMMON FEMORAL PHASIC: NORMAL
BH CV LOWER VASCULAR LEFT COMMON FEMORAL SPONT: NORMAL
BH CV LOWER VASCULAR LEFT DISTAL FEMORAL AUGMENT: NORMAL
BH CV LOWER VASCULAR LEFT DISTAL FEMORAL COMPETENT: NORMAL
BH CV LOWER VASCULAR LEFT DISTAL FEMORAL COMPRESS: NORMAL
BH CV LOWER VASCULAR LEFT DISTAL FEMORAL PHASIC: NORMAL
BH CV LOWER VASCULAR LEFT DISTAL FEMORAL SPONT: NORMAL
BH CV LOWER VASCULAR LEFT DISTAL FEMORAL THROMBUS: NORMAL
BH CV LOWER VASCULAR LEFT GASTRONEMIUS COMPRESS: NORMAL
BH CV LOWER VASCULAR LEFT GREATER SAPH AK COMPRESS: NORMAL
BH CV LOWER VASCULAR LEFT GREATER SAPH BK COMPRESS: NORMAL
BH CV LOWER VASCULAR LEFT LESSER SAPH COMPRESS: NORMAL
BH CV LOWER VASCULAR LEFT MID FEMORAL AUGMENT: NORMAL
BH CV LOWER VASCULAR LEFT MID FEMORAL COMPETENT: NORMAL
BH CV LOWER VASCULAR LEFT MID FEMORAL COMPRESS: NORMAL
BH CV LOWER VASCULAR LEFT MID FEMORAL PHASIC: NORMAL
BH CV LOWER VASCULAR LEFT MID FEMORAL SPONT: NORMAL
BH CV LOWER VASCULAR LEFT PERONEAL COMPRESS: NORMAL
BH CV LOWER VASCULAR LEFT POPLITEAL AUGMENT: NORMAL
BH CV LOWER VASCULAR LEFT POPLITEAL COMPETENT: NORMAL
BH CV LOWER VASCULAR LEFT POPLITEAL COMPRESS: NORMAL
BH CV LOWER VASCULAR LEFT POPLITEAL PHASIC: NORMAL
BH CV LOWER VASCULAR LEFT POPLITEAL SPONT: NORMAL
BH CV LOWER VASCULAR LEFT POPLITEAL THROMBUS: NORMAL
BH CV LOWER VASCULAR LEFT POSTERIOR TIBIAL COMPRESS: NORMAL
BH CV LOWER VASCULAR LEFT PROXIMAL FEMORAL COMPRESS: NORMAL
BH CV LOWER VASCULAR LEFT SAPHENOFEMORAL JUNCTION COMPRESS: NORMAL
BH CV LOWER VASCULAR RIGHT COMMON FEMORAL AUGMENT: NORMAL
BH CV LOWER VASCULAR RIGHT COMMON FEMORAL COMPETENT: NORMAL
BH CV LOWER VASCULAR RIGHT COMMON FEMORAL COMPRESS: NORMAL
BH CV LOWER VASCULAR RIGHT COMMON FEMORAL PHASIC: NORMAL
BH CV LOWER VASCULAR RIGHT COMMON FEMORAL SPONT: NORMAL
MAXIMAL PREDICTED HEART RATE: 175 BPM
STRESS TARGET HR: 149 BPM

## 2023-03-08 PROCEDURE — 93971 EXTREMITY STUDY: CPT

## 2023-03-08 PROCEDURE — 93971 EXTREMITY STUDY: CPT | Performed by: SURGERY

## 2023-03-08 PROCEDURE — 70551 MRI BRAIN STEM W/O DYE: CPT

## 2023-03-09 ENCOUNTER — OFFICE VISIT (OUTPATIENT)
Dept: INTERNAL MEDICINE | Facility: CLINIC | Age: 48
End: 2023-03-09
Payer: COMMERCIAL

## 2023-03-09 VITALS
OXYGEN SATURATION: 97 % | HEART RATE: 86 BPM | SYSTOLIC BLOOD PRESSURE: 112 MMHG | HEIGHT: 68 IN | TEMPERATURE: 98.4 F | BODY MASS INDEX: 28.49 KG/M2 | DIASTOLIC BLOOD PRESSURE: 74 MMHG | WEIGHT: 188 LBS

## 2023-03-09 DIAGNOSIS — I82.532 CHRONIC DEEP VEIN THROMBOSIS (DVT) OF POPLITEAL VEIN OF LEFT LOWER EXTREMITY: ICD-10-CM

## 2023-03-09 DIAGNOSIS — R90.82 WHITE MATTER DISEASE, UNSPECIFIED: ICD-10-CM

## 2023-03-09 DIAGNOSIS — H11.32 SUBCONJUNCTIVAL HEMATOMA, LEFT: ICD-10-CM

## 2023-03-09 DIAGNOSIS — F41.1 GAD (GENERALIZED ANXIETY DISORDER): Primary | ICD-10-CM

## 2023-03-09 DIAGNOSIS — H53.9 VISION DISTURBANCE: ICD-10-CM

## 2023-03-09 DIAGNOSIS — I10 PRIMARY HYPERTENSION: ICD-10-CM

## 2023-03-09 DIAGNOSIS — I82.512 CHRONIC DEEP VEIN THROMBOSIS (DVT) OF FEMORAL VEIN OF LEFT LOWER EXTREMITY: ICD-10-CM

## 2023-03-09 PROCEDURE — 99214 OFFICE O/P EST MOD 30 MIN: CPT | Performed by: NURSE PRACTITIONER

## 2023-03-09 RX ORDER — LISINOPRIL 2.5 MG/1
2.5 TABLET ORAL DAILY
Qty: 90 TABLET | Refills: 1 | Status: SHIPPED | OUTPATIENT
Start: 2023-03-09

## 2023-03-09 NOTE — PROGRESS NOTES
"Chief Complaint  Hypertension (4 week f/up) and Results (Wanting to discuss US and MRI done yesterday )    Subjective          Britany Pyle presents to [facility]  History of Present Illness    Anxiety  Presents for follow up  visit. Symptoms include nervous/anxious behavior. Patient reports no chest pain, compulsions, confusion, decreased concentration, depressed mood, dizziness, dry mouth, excessive worry, feeling of choking, hyperventilation, impotence, insomnia, irritability, malaise, muscle tension, nausea, obsessions, palpitations, panic, restlessness, shortness of breath or suicidal ideas.       Hypertension  This is a new problem. Dx 4 weeks ago Associated symptoms include anxiety. Pertinent negatives include no blurred vision, chest pain, headaches, malaise/fatigue, neck pain, orthopnea, palpitations, peripheral edema, PND, shortness of breath or sweats. Compliance problems include diet and exercise.  There is no history of angina, kidney disease, CAD/MI, CVA, heart failure, left ventricular hypertrophy, PVD or retinopathy.     Vision issues have been better over the last month   Current Outpatient Medications   Medication Instructions   • apixaban (ELIQUIS) 5 mg, Oral, 2 Times Daily   • busPIRone (BUSPAR) 5 mg, Oral, 3 Times Daily   • fluticasone (Flonase) 50 MCG/ACT nasal spray 2 sprays, Nasal, Daily   • lisinopril (PRINIVIL,ZESTRIL) 2.5 mg, Oral, Daily   • Multiple Vitamins-Minerals (WOMENS MULTIVITAMIN PO) Oral       The following portions of the patient's history were reviewed and updated as appropriate: allergies, current medications, past family history, past medical history, past social history, past surgical history, and problem list.    Objective   Vital Signs:   /74 (BP Location: Left arm, Patient Position: Sitting, Cuff Size: Adult)   Pulse 86   Temp 98.4 °F (36.9 °C) (Temporal)   Ht 172.7 cm (68\")   Wt 85.3 kg (188 lb)   SpO2 97%   " BMI 28.59 kg/m²     Wt Readings from Last 3 Encounters:   03/09/23 85.3 kg (188 lb)   02/03/23 84.2 kg (185 lb 9.6 oz)   10/27/22 82.1 kg (181 lb)     BP Readings from Last 3 Encounters:   03/09/23 112/74   02/03/23 142/94   10/27/22 129/84     Physical Exam   Appearance: No acute distress, well-nourished  Head: normocephalic, atraumatic  Eyes: extraocular movements intact, no scleral icterus, no conjunctival injection; subconjunctival hemorrhage noted to left eye   Ears, Nose, and Throat: external ears normal, nares patent, moist mucous membranes  Cardiovascular: regular rate and rhythm. no murmurs, rubs, or gallops. no edema  Respiratory: breathing comfortably, symmetric chest rise, clear to auscultation bilaterally. No wheezes, rales, or rhonchi.  Neuro: alert and oriented to time, place, and person. Normal gait  Psych: normal mood and affect     Result Review :   The following data was reviewed by: ROYCE Solis on 03/09/2023:  Common labs    Common Labs 9/12/22 9/12/22 2/3/23 2/3/23 2/3/23    1626 1626 1116 1116 1116   Glucose 110 (A)    106 (A)   BUN 8    9   Creatinine 0.80    0.86   Sodium 140    138   Potassium 3.9    4.3   Chloride 103    102   Calcium 9.5    9.7   Albumin 4.40    4.5   Total Bilirubin 0.3    0.4   Alkaline Phosphatase 82    80   AST (SGOT) 17    16   ALT (SGPT) 13    19   WBC   7.48     Hemoglobin   12.5     Hematocrit   37.0     Platelets   314     Total Cholesterol  176  186    Triglycerides  286 (A)  124    HDL Cholesterol  50  42    LDL Cholesterol   80  122 (A)    (A) Abnormal value              Data reviewed: Venous US & MRI brain    Lab Results   Component Value Date    COVID19 NOT DETECTED 04/28/2020    POCPREGUR Negative 09/10/2021       Procedures        Assessment and Plan    Diagnoses and all orders for this visit:    1. ABEL (generalized anxiety disorder) (Primary)  Assessment & Plan:  Psychological condition is improving with treatment.  Continue current treatment  regimen.  Regular aerobic exercise.  Psychological condition  will be reassessed in 3 months.      2. Primary hypertension  Assessment & Plan:  Hypertension is improving with treatment.  Continue current treatment regimen.  Dietary sodium restriction.  Weight loss.  Regular aerobic exercise.  Continue current medications.  Blood pressure will be reassessed in 3 months.    Orders:  -     lisinopril (PRINIVIL,ZESTRIL) 2.5 MG tablet; Take 1 tablet by mouth Daily.  Dispense: 90 tablet; Refill: 1    3. Vision disturbance  -     Ambulatory Referral to Neurology    4. Subconjunctival hematoma, left  Comments:  pt counseled on progression    5. White matter disease, unspecified  -     Ambulatory Referral to Neurology    6. Chronic deep vein thrombosis (DVT) of femoral vein of left lower extremity (HCC)  -     apixaban (ELIQUIS) 5 MG tablet tablet; Take 1 tablet by mouth 2 (Two) Times a Day.  Dispense: 60 tablet; Refill: 1    7. Chronic deep vein thrombosis (DVT) of popliteal vein of left lower extremity (HCC)  -     apixaban (ELIQUIS) 5 MG tablet tablet; Take 1 tablet by mouth 2 (Two) Times a Day.  Dispense: 60 tablet; Refill: 1        Medications Discontinued During This Encounter   Medication Reason   • ELDERBERRY PO *Therapy completed   • lisinopril (PRINIVIL,ZESTRIL) 2.5 MG tablet Reorder   • apixaban (ELIQUIS) 5 MG tablet tablet Reorder          Follow Up   Return in about 7 weeks (around 4/28/2023) for Hypertension.  Patient was given instructions and counseling regarding her condition or for health maintenance advice. Please see specific information pulled into the AVS if appropriate.       Fidelina Mcghee, ROYCE  03/09/23  11:43 EST

## 2023-04-05 DIAGNOSIS — H69.83 EUSTACHIAN TUBE DYSFUNCTION, BILATERAL: ICD-10-CM

## 2023-04-05 RX ORDER — FLUTICASONE PROPIONATE 50 MCG
SPRAY, SUSPENSION (ML) NASAL
Qty: 16 ML | OUTPATIENT
Start: 2023-04-05

## 2023-04-07 ENCOUNTER — HOSPITAL ENCOUNTER (OUTPATIENT)
Dept: MAMMOGRAPHY | Facility: HOSPITAL | Age: 48
Discharge: HOME | End: 2023-04-07
Admitting: NURSE PRACTITIONER
Payer: COMMERCIAL

## 2023-04-07 DIAGNOSIS — Z12.31 SCREENING MAMMOGRAM FOR HIGH-RISK PATIENT: ICD-10-CM

## 2023-04-07 PROCEDURE — 77067 SCR MAMMO BI INCL CAD: CPT

## 2023-04-07 PROCEDURE — 77063 BREAST TOMOSYNTHESIS BI: CPT

## 2023-04-08 DIAGNOSIS — F41.1 GAD (GENERALIZED ANXIETY DISORDER): ICD-10-CM

## 2023-04-10 RX ORDER — BUSPIRONE HYDROCHLORIDE 5 MG/1
TABLET ORAL
Qty: 90 TABLET | Refills: 0 | Status: SHIPPED | OUTPATIENT
Start: 2023-04-10

## 2023-04-27 ENCOUNTER — OFFICE VISIT (OUTPATIENT)
Dept: INTERNAL MEDICINE | Facility: CLINIC | Age: 48
End: 2023-04-27
Payer: COMMERCIAL

## 2023-04-27 VITALS
BODY MASS INDEX: 28.49 KG/M2 | DIASTOLIC BLOOD PRESSURE: 80 MMHG | WEIGHT: 188 LBS | TEMPERATURE: 97.4 F | OXYGEN SATURATION: 95 % | HEART RATE: 98 BPM | HEIGHT: 68 IN | SYSTOLIC BLOOD PRESSURE: 128 MMHG

## 2023-04-27 DIAGNOSIS — E66.3 OVERWEIGHT (BMI 25.0-29.9): ICD-10-CM

## 2023-04-27 DIAGNOSIS — I10 PRIMARY HYPERTENSION: Primary | ICD-10-CM

## 2023-04-27 DIAGNOSIS — F41.1 GAD (GENERALIZED ANXIETY DISORDER): ICD-10-CM

## 2023-04-27 PROBLEM — M25.532 LEFT WRIST PAIN: Status: RESOLVED | Noted: 2022-02-23 | Resolved: 2023-04-27

## 2023-04-27 PROBLEM — R60.0 LOCALIZED EDEMA: Status: RESOLVED | Noted: 2022-10-07 | Resolved: 2023-04-27

## 2023-04-27 PROBLEM — Z32.00 POSSIBLE PREGNANCY: Status: RESOLVED | Noted: 2021-09-10 | Resolved: 2023-04-27

## 2023-04-27 PROBLEM — I82.90 DEEP VEIN THROMBOSIS (DVT) ASSOCIATED WITH COVID-19: Status: RESOLVED | Noted: 2022-10-07 | Resolved: 2023-04-27

## 2023-04-27 PROBLEM — F41.9 ANXIETY: Status: RESOLVED | Noted: 2022-09-12 | Resolved: 2023-04-27

## 2023-04-27 PROBLEM — U07.1 DEEP VEIN THROMBOSIS (DVT) ASSOCIATED WITH COVID-19: Status: RESOLVED | Noted: 2022-10-07 | Resolved: 2023-04-27

## 2023-04-27 PROBLEM — R06.02 SOB (SHORTNESS OF BREATH): Status: RESOLVED | Noted: 2022-02-23 | Resolved: 2023-04-27

## 2023-04-27 PROBLEM — U07.1 PNEUMONIA DUE TO COVID-19 VIRUS: Status: RESOLVED | Noted: 2022-02-03 | Resolved: 2023-04-27

## 2023-04-27 PROBLEM — Z00.00 ANNUAL PHYSICAL EXAM: Status: RESOLVED | Noted: 2021-09-10 | Resolved: 2023-04-27

## 2023-04-27 PROBLEM — H53.9 VISION DISTURBANCE: Status: RESOLVED | Noted: 2022-09-12 | Resolved: 2023-04-27

## 2023-04-27 PROBLEM — N93.9 ABNORMAL UTERINE AND VAGINAL BLEEDING, UNSPECIFIED: Status: RESOLVED | Noted: 2019-07-24 | Resolved: 2023-04-27

## 2023-04-27 PROBLEM — T14.8XXA BROKEN BONES: Status: RESOLVED | Noted: 2022-02-03 | Resolved: 2023-04-27

## 2023-04-27 PROBLEM — J12.82 PNEUMONIA DUE TO COVID-19 VIRUS: Status: RESOLVED | Noted: 2022-02-03 | Resolved: 2023-04-27

## 2023-04-27 PROBLEM — Z71.89 ENCOUNTER FOR SURGICAL COUNSELING: Status: RESOLVED | Noted: 2023-01-09 | Resolved: 2023-04-27

## 2023-04-27 RX ORDER — AMLODIPINE BESYLATE 2.5 MG/1
2.5 TABLET ORAL DAILY
Qty: 90 TABLET | Refills: 1 | Status: SHIPPED | OUTPATIENT
Start: 2023-04-27

## 2023-04-27 RX ORDER — SEMAGLUTIDE 0.25 MG/.5ML
0.25 INJECTION, SOLUTION SUBCUTANEOUS WEEKLY
Qty: 2 ML | Refills: 0 | Status: SHIPPED | OUTPATIENT
Start: 2023-04-27

## 2023-04-27 RX ORDER — SODIUM, POTASSIUM,MAG SULFATES 17.5-3.13G
SOLUTION, RECONSTITUTED, ORAL ORAL
COMMUNITY
Start: 2023-03-22

## 2023-04-27 NOTE — PROGRESS NOTES
Chief Complaint  Hypertension (7 week follow-up)    Subjective          Britany Pyle presents to [facility]  Obesity  This is a new problem. Pertinent negatives include no abdominal pain, anorexia, arthralgias, change in bowel habit, chest pain, chills, congestion, coughing, diaphoresis, fatigue, fever, headaches, joint swelling, myalgias, nausea, neck pain, numbness, rash, sore throat, swollen glands, urinary symptoms, vertigo, visual change, vomiting or weakness.     Anxiety  Presents for follow up  visit. Symptoms include nervous/anxious behavior. Patient reports no chest pain, compulsions, confusion, decreased concentration, depressed mood, dizziness, dry mouth, excessive worry, feeling of choking, hyperventilation, impotence, insomnia, irritability, malaise, muscle tension, nausea, obsessions, palpitations, panic, restlessness, shortness of breath or suicidal ideas.       Hypertension  This is a new problem. Dx 4 weeks ago Associated symptoms include anxiety. Pertinent negatives include no blurred vision, chest pain, headaches, malaise/fatigue, neck pain, orthopnea, palpitations, peripheral edema, PND, shortness of breath or sweats. Compliance problems include diet and exercise.  There is no history of angina, kidney disease, CAD/MI, CVA, heart failure, left ventricular hypertrophy, PVD or retinopathy.   Has noticed dry cough       Buspar is working well         Current Outpatient Medications   Medication Instructions   • amLODIPine (NORVASC) 2.5 mg, Oral, Daily   • apixaban (ELIQUIS) 5 mg, Oral, 2 Times Daily   • busPIRone (BUSPAR) 5 MG tablet TAKE 1 TABLET BY MOUTH THREE TIMES DAILY   • fluticasone (Flonase) 50 MCG/ACT nasal spray 2 sprays, Nasal, Daily   • Multiple Vitamins-Minerals (WOMENS MULTIVITAMIN PO) Oral   • sodium-potassium-magnesium sulfates (SUPREP) 17.5-3.13-1.6 GM/177ML solution oral solution No dose, route, or frequency recorded.   • Wegovy  "0.25 mg, Subcutaneous, Weekly       The following portions of the patient's history were reviewed and updated as appropriate: allergies, current medications, past family history, past medical history, past social history, past surgical history, and problem list.    Objective   Vital Signs:   /80 (BP Location: Right arm, Patient Position: Sitting, Cuff Size: Adult)   Pulse 98   Temp 97.4 °F (36.3 °C) (Temporal)   Ht 172.7 cm (68\")   Wt 85.3 kg (188 lb)   SpO2 95%   BMI 28.59 kg/m²     Wt Readings from Last 3 Encounters:   04/27/23 85.3 kg (188 lb)   03/09/23 85.3 kg (188 lb)   02/03/23 84.2 kg (185 lb 9.6 oz)     BP Readings from Last 3 Encounters:   04/27/23 128/80   03/09/23 112/74   02/03/23 142/94     Physical Exam  Constitutional:       Appearance: She is overweight.        Appearance: No acute distress, well-nourished  Head: normocephalic, atraumatic  Eyes: extraocular movements intact, no scleral icterus, no conjunctival injection  Ears, Nose, and Throat: external ears normal, nares patent, moist mucous membranes  Cardiovascular: regular rate and rhythm. no murmurs, rubs, or gallops. no edema  Respiratory: breathing comfortably, symmetric chest rise, clear to auscultation bilaterally. No wheezes, rales, or rhonchi.  Neuro: alert and oriented to time, place, and person. Normal gait  Psych: normal mood and affect     Result Review :   The following data was reviewed by: ROYCE Solis on 04/27/2023:  Common labs        9/12/2022    16:26 2/3/2023    11:16   Common Labs   Glucose 110   106     BUN 8   9     Creatinine 0.80   0.86     Sodium 140   138     Potassium 3.9   4.3     Chloride 103   102     Calcium 9.5   9.7     Albumin 4.40   4.5     Total Bilirubin 0.3   0.4     Alkaline Phosphatase 82   80     AST (SGOT) 17   16     ALT (SGPT) 13   19     WBC  7.48     Hemoglobin  12.5     Hematocrit  37.0     Platelets  314     Total Cholesterol 176   186     Triglycerides 286   124     HDL " Cholesterol 50   42     LDL Cholesterol  80   122              Lab Results   Component Value Date    COVID19 NOT DETECTED 04/28/2020    POCPREGUR Negative 09/10/2021       Procedures        Assessment and Plan    Diagnoses and all orders for this visit:    1. Primary hypertension (Primary)  Assessment & Plan:  Hypertension is improving with treatment.  Dietary sodium restriction.  Weight loss.  Regular aerobic exercise.  Medication changes per orders.  Blood pressure will be reassessed in 4 weeks.    Pt has dry cough - switching to amlodipine       Orders:  -     amLODIPine (NORVASC) 2.5 MG tablet; Take 1 tablet by mouth Daily.  Dispense: 90 tablet; Refill: 1    2. Overweight (BMI 25.0-29.9)  Assessment & Plan:  Patient's (Body mass index is 28.59 kg/m².) indicates that they are overweight with health conditions that include hypertension and dyslipidemias . Weight is worsening. BMI is is above average; BMI management plan is completed. We discussed low calorie, low carb based diet program, portion control and increasing exercise.     Has tried diet, exercise and wellbutrin.     Will trial Wegovy. Risks vs benefits discussed. Discussed side effects.     Orders:  -     Semaglutide-Weight Management (Wegovy) 0.25 MG/0.5ML solution auto-injector; Inject 0.25 mg under the skin into the appropriate area as directed 1 (One) Time Per Week.  Dispense: 2 mL; Refill: 0    3. ABEL (generalized anxiety disorder)  Assessment & Plan:  Psychological condition is improving with treatment.  Continue current treatment regimen.  Regular aerobic exercise.  Psychological condition  will be reassessed in 3 months.          Medications Discontinued During This Encounter   Medication Reason   • lisinopril (PRINIVIL,ZESTRIL) 2.5 MG tablet Historical Med - Therapy completed          Follow Up   Return in about 6 weeks (around 6/8/2023) for Hypertension, Weight.  Patient was given instructions and counseling regarding her condition or for  health maintenance advice. Please see specific information pulled into the AVS if appropriate.       ROYCE oSlis  04/27/23  12:58 EDT

## 2023-04-27 NOTE — ASSESSMENT & PLAN NOTE
Patient's (Body mass index is 28.59 kg/m².) indicates that they are overweight with health conditions that include hypertension and dyslipidemias . Weight is worsening. BMI is is above average; BMI management plan is completed. We discussed low calorie, low carb based diet program, portion control and increasing exercise.     Has tried diet, exercise and wellbutrin.     Will trial Wegovy. Risks vs benefits discussed. Discussed side effects.

## 2023-04-27 NOTE — ASSESSMENT & PLAN NOTE
Hypertension is improving with treatment.  Dietary sodium restriction.  Weight loss.  Regular aerobic exercise.  Medication changes per orders.  Blood pressure will be reassessed in 4 weeks.    Pt has dry cough - switching to amlodipine

## 2023-05-02 ENCOUNTER — PRIOR AUTHORIZATION (OUTPATIENT)
Dept: INTERNAL MEDICINE | Facility: CLINIC | Age: 48
End: 2023-05-02
Payer: COMMERCIAL

## 2023-05-02 NOTE — TELEPHONE ENCOUNTER
PA REQUIRED:    Semaglutide-Weight Management (Wegovy) 0.25 MG/0.5ML solution auto-injector (04/27/2023)    INDEXED VIA ONBASE

## 2023-05-03 NOTE — TELEPHONE ENCOUNTER
Approved    CaseId:[id number];Status:Approved;Review Type:Prior Auth;Coverage Start Date:04/03/2023;Coverage End Date:11/29/2023;

## 2023-06-08 ENCOUNTER — OFFICE VISIT (OUTPATIENT)
Dept: INTERNAL MEDICINE | Facility: CLINIC | Age: 48
End: 2023-06-08
Payer: COMMERCIAL

## 2023-06-08 VITALS
HEIGHT: 68 IN | SYSTOLIC BLOOD PRESSURE: 148 MMHG | HEART RATE: 88 BPM | WEIGHT: 186 LBS | OXYGEN SATURATION: 98 % | TEMPERATURE: 98.7 F | DIASTOLIC BLOOD PRESSURE: 93 MMHG | BODY MASS INDEX: 28.19 KG/M2

## 2023-06-08 DIAGNOSIS — E66.3 OVERWEIGHT (BMI 25.0-29.9): ICD-10-CM

## 2023-06-08 DIAGNOSIS — F41.1 GAD (GENERALIZED ANXIETY DISORDER): ICD-10-CM

## 2023-06-08 DIAGNOSIS — I10 PRIMARY HYPERTENSION: Primary | ICD-10-CM

## 2023-06-08 DIAGNOSIS — Z12.11 ENCOUNTER FOR SCREENING FOR MALIGNANT NEOPLASM OF COLON: ICD-10-CM

## 2023-06-08 PROBLEM — N92.0 MENORRHAGIA: Status: ACTIVE | Noted: 2023-02-17

## 2023-06-08 RX ORDER — SEMAGLUTIDE 0.5 MG/.5ML
0.5 INJECTION, SOLUTION SUBCUTANEOUS WEEKLY
Qty: 2 ML | Refills: 0 | Status: SHIPPED | OUTPATIENT
Start: 2023-06-08

## 2023-06-08 NOTE — ASSESSMENT & PLAN NOTE
Patient's (Body mass index is 28.28 kg/m².) indicates that they are overweight with health conditions that include hypertension . Weight is improving with treatment. BMI is is above average; BMI management plan is completed. We discussed low calorie, low carb based diet program, portion control, and increasing exercise.     Increasing Wegovy to 0.5.   Discussed weighing food/tracking caloris with My Fitness Pal   Increasing water intake.   Avoiding liquid calories.

## 2023-06-08 NOTE — PROGRESS NOTES
Chief Complaint  Hypertension (6 week follow-up. Patient forgot to take medication yesterday and today. ) and Obesity (6 week follow-up)    Subjective          Britany Pyle presents to [facility]  History of Present Illness    Obesity  This is a chronic problem. Pertinent negatives include no abdominal pain, anorexia, arthralgias, change in bowel habit, chest pain, chills, congestion, coughing, diaphoresis, fatigue, fever, headaches, joint swelling, myalgias, nausea, neck pain, numbness, rash, sore throat, swollen glands, urinary symptoms, vertigo, visual change, vomiting or weakness.     Anxiety  Presents for follow up  visit. Symptoms include nervous/anxious behavior. Patient reports no chest pain, compulsions, confusion, decreased concentration, depressed mood, dizziness, dry mouth, excessive worry, feeling of choking, hyperventilation, impotence, insomnia, irritability, malaise, muscle tension, nausea, obsessions, palpitations, panic, restlessness, shortness of breath or suicidal ideas.       Hypertension   Associated symptoms include anxiety. Pertinent negatives include no blurred vision, chest pain, headaches, malaise/fatigue, neck pain, orthopnea, palpitations, peripheral edema, PND, shortness of breath or sweats. Compliance problems include diet and exercise.  There is no history of angina, kidney disease, CAD/MI, CVA, heart failure, left ventricular hypertrophy, PVD or retinopathy.         Buspar is working well       Patient had colonoscopy done 05/12/2023 at [facility].     Current Outpatient Medications   Medication Instructions    amLODIPine (NORVASC) 2.5 mg, Oral, Daily    apixaban (ELIQUIS) 5 mg, Oral, 2 Times Daily    busPIRone (BUSPAR) 5 MG tablet TAKE 1 TABLET BY MOUTH THREE TIMES DAILY    fluticasone (Flonase) 50 MCG/ACT nasal spray 2 sprays, Nasal, Daily    Multiple Vitamins-Minerals (WOMENS MULTIVITAMIN PO) Oral    sodium-potassium-magnesium  "sulfates (SUPREP) 17.5-3.13-1.6 GM/177ML solution oral solution No dose, route, or frequency recorded.    Wegovy 0.5 mg, Subcutaneous, Weekly       The following portions of the patient's history were reviewed and updated as appropriate: allergies, current medications, past family history, past medical history, past social history, past surgical history, and problem list.    Objective   Vital Signs:   /93 (BP Location: Right arm, Patient Position: Sitting, Cuff Size: Large Adult)   Pulse 88   Temp 98.7 °F (37.1 °C) (Temporal)   Ht 172.7 cm (68\")   Wt 84.4 kg (186 lb)   SpO2 98%   BMI 28.28 kg/m²     Wt Readings from Last 3 Encounters:   06/08/23 84.4 kg (186 lb)   04/27/23 85.3 kg (188 lb)   03/09/23 85.3 kg (188 lb)     BP Readings from Last 3 Encounters:   06/08/23 148/93   04/27/23 128/80   03/09/23 112/74     Physical Exam  Constitutional:       Appearance: She is overweight.      Appearance: No acute distress, well-nourished  Head: normocephalic, atraumatic  Eyes: extraocular movements intact, no scleral icterus, no conjunctival injection  Ears, Nose, and Throat: external ears normal, nares patent, moist mucous membranes  Cardiovascular: regular rate and rhythm. no murmurs, rubs, or gallops. no edema  Respiratory: breathing comfortably, symmetric chest rise, clear to auscultation bilaterally. No wheezes, rales, or rhonchi.  Neuro: alert and oriented to time, place, and person. Normal gait  Psych: normal mood and affect     Result Review :   The following data was reviewed by: ROYCE Solis on 06/08/2023:  Common labs          9/12/2022    16:26 2/3/2023    11:16   Common Labs   Glucose 110  106    BUN 8  9    Creatinine 0.80  0.86    Sodium 140  138    Potassium 3.9  4.3    Chloride 103  102    Calcium 9.5  9.7    Albumin 4.40  4.5    Total Bilirubin 0.3  0.4    Alkaline Phosphatase 82  80    AST (SGOT) 17  16    ALT (SGPT) 13  19    WBC  7.48    Hemoglobin  12.5    Hematocrit  37.0  "   Platelets  314    Total Cholesterol 176  186    Triglycerides 286  124    HDL Cholesterol 50  42    LDL Cholesterol  80  122             Lab Results   Component Value Date    COVID19 NOT DETECTED 04/28/2020    POCPREGUR Negative 09/10/2021       Procedures        Assessment and Plan    Diagnoses and all orders for this visit:    1. Primary hypertension (Primary)  Assessment & Plan:  Hypertension is unchanged.  Continue current treatment regimen.  Dietary sodium restriction.  Weight loss.  Regular aerobic exercise.  Ambulatory blood pressure monitoring.  Blood pressure will be reassessed in 4 weeks.    Elevated in clinic today. Has missed meds x 2 days. Will keep dosage the same until next f/u. Encouraged patient's to take meds as prescribed.       2. Encounter for screening for malignant neoplasm of colon    3. Overweight (BMI 25.0-29.9)  Assessment & Plan:  Patient's (Body mass index is 28.28 kg/m².) indicates that they are overweight with health conditions that include hypertension . Weight is improving with treatment. BMI is is above average; BMI management plan is completed. We discussed low calorie, low carb based diet program, portion control, and increasing exercise.     Increasing Wegovy to 0.5.   Discussed weighing food/tracking caloris with My Fitness Pal   Increasing water intake.   Avoiding liquid calories.     Orders:  -     Semaglutide-Weight Management (Wegovy) 0.5 MG/0.5ML solution auto-injector; Inject 0.5 mL under the skin into the appropriate area as directed 1 (One) Time Per Week.  Dispense: 2 mL; Refill: 0    4. ABEL (generalized anxiety disorder)  Assessment & Plan:  Psychological condition is improving with treatment.  Continue current treatment regimen.  Psychological condition  will be reassessed in 4 weeks.            Medications Discontinued During This Encounter   Medication Reason    Semaglutide-Weight Management (Wegovy) 0.25 MG/0.5ML solution auto-injector           Follow Up   Return  in about 4 weeks (around 7/6/2023) for Hypertension, Weight.  Patient was given instructions and counseling regarding her condition or for health maintenance advice. Please see specific information pulled into the AVS if appropriate.       ROYCE Solis  06/08/23  11:20 EDT

## 2023-06-09 ENCOUNTER — TELEPHONE (OUTPATIENT)
Dept: PEDIATRICS | Facility: OTHER | Age: 48
End: 2023-06-09
Payer: COMMERCIAL

## 2023-06-09 NOTE — TELEPHONE ENCOUNTER
Caller: Britany Pyle    Relationship to patient: Self    Best call back number: [phone]     Patient is needing: PATIENT STATED THAT THE WEGOVY THAT WAS DISCUSSED AT 6/8/23 APPOINTMENT WAS NOT RECEIVED BY   [id number] [place] [facility] [address] AT [facility] [phone] [facility] [phone] FX

## 2023-06-09 NOTE — TELEPHONE ENCOUNTER
Spoke with patient. Verified .   Made patient aware it was sent and received by pharmacy. She stated that is correct they called her back  and told her it is just out of stock.

## 2023-06-13 NOTE — ASSESSMENT & PLAN NOTE
Hypertension is unchanged.  Continue current treatment regimen.  Dietary sodium restriction.  Weight loss.  Regular aerobic exercise.  Ambulatory blood pressure monitoring.  Blood pressure will be reassessed in 4 weeks.    Elevated in clinic today. Has missed meds x 2 days. Will keep dosage the same until next f/u. Encouraged patient's to take meds as prescribed.    [Negative] : Heme/Lymph

## 2023-07-28 DIAGNOSIS — R06.02 SOB (SHORTNESS OF BREATH): Primary | ICD-10-CM

## 2023-07-28 RX ORDER — PREDNISONE 50 MG/1
50 TABLET ORAL DAILY
Qty: 5 TABLET | Refills: 0 | Status: SHIPPED | OUTPATIENT
Start: 2023-07-28

## 2023-08-01 DIAGNOSIS — I82.512 CHRONIC DEEP VEIN THROMBOSIS (DVT) OF FEMORAL VEIN OF LEFT LOWER EXTREMITY: ICD-10-CM

## 2023-08-01 DIAGNOSIS — I82.532 CHRONIC DEEP VEIN THROMBOSIS (DVT) OF POPLITEAL VEIN OF LEFT LOWER EXTREMITY: ICD-10-CM

## 2023-08-01 RX ORDER — APIXABAN 5 MG/1
TABLET, FILM COATED ORAL
Qty: 180 TABLET | Refills: 1 | Status: SHIPPED | OUTPATIENT
Start: 2023-08-01

## 2023-08-18 ENCOUNTER — OFFICE VISIT (OUTPATIENT)
Dept: INTERNAL MEDICINE | Facility: CLINIC | Age: 48
End: 2023-08-18
Payer: COMMERCIAL

## 2023-08-18 VITALS
TEMPERATURE: 97.7 F | HEIGHT: 68 IN | HEART RATE: 94 BPM | OXYGEN SATURATION: 97 % | WEIGHT: 182 LBS | DIASTOLIC BLOOD PRESSURE: 98 MMHG | SYSTOLIC BLOOD PRESSURE: 153 MMHG | BODY MASS INDEX: 27.58 KG/M2

## 2023-08-18 DIAGNOSIS — I10 PRIMARY HYPERTENSION: Chronic | ICD-10-CM

## 2023-08-18 DIAGNOSIS — K21.9 GASTROESOPHAGEAL REFLUX DISEASE WITHOUT ESOPHAGITIS: ICD-10-CM

## 2023-08-18 DIAGNOSIS — E66.3 OVERWEIGHT (BMI 25.0-29.9): ICD-10-CM

## 2023-08-18 DIAGNOSIS — F41.1 GAD (GENERALIZED ANXIETY DISORDER): Primary | ICD-10-CM

## 2023-08-18 RX ORDER — SEMAGLUTIDE 1 MG/.5ML
1 INJECTION, SOLUTION SUBCUTANEOUS WEEKLY
Qty: 3 ML | Refills: 0 | Status: SHIPPED | OUTPATIENT
Start: 2023-08-18

## 2023-08-18 NOTE — PROGRESS NOTES
Chief Complaint  Hypertension    Subjective          Britany Pyle presents to [facility]  History of Present Illness    Heartburn  She complains of coughing and globus sensation. She reports no abdominal pain, no belching, no chest pain, no choking, no dysphagia, no early satiety, no heartburn, no hoarse voice, no nausea, no sore throat, no stridor, no tooth decay, no water brash or no wheezing. This is a new problem. The current episode started today. Pertinent negatives include no anemia, fatigue, melena, muscle weakness, orthopnea or weight loss. She has tried nothing for the symptoms.     Obesity  This is a chronic problem. Pertinent negatives include no abdominal pain, anorexia, arthralgias, change in bowel habit, chest pain, chills, congestion, coughing, diaphoresis, fatigue, fever, headaches, joint swelling, myalgias, nausea, neck pain, numbness, rash, sore throat, swollen glands, urinary symptoms, vertigo, visual change, vomiting or weakness.     Anxiety  Presents for follow up  visit. Symptoms include nervous/anxious behavior. Patient reports no chest pain, compulsions, confusion, decreased concentration, depressed mood, dizziness, dry mouth, excessive worry, feeling of choking, hyperventilation, impotence, insomnia, irritability, malaise, muscle tension, nausea, obsessions, palpitations, panic, restlessness, shortness of breath or suicidal ideas.       Hypertension   Associated symptoms include anxiety. Pertinent negatives include no blurred vision, chest pain, headaches, malaise/fatigue, neck pain, orthopnea, palpitations, peripheral edema, PND, shortness of breath or sweats. Compliance problems include diet and exercise.  There is no history of angina, kidney disease, CAD/MI, CVA, heart failure, left ventricular hypertrophy, PVD or retinopathy.     Current Outpatient Medications   Medication Instructions    amLODIPine (NORVASC) 5 mg, Oral, Daily     "apixaban (Eliquis) 5 MG tablet tablet TAKE 1 TABLET BY MOUTH TWICE DAILY    busPIRone (BUSPAR) 5 MG tablet TAKE 1 TABLET BY MOUTH THREE TIMES DAILY    fluticasone (Flonase) 50 MCG/ACT nasal spray 2 sprays, Nasal, Daily    Multiple Vitamins-Minerals (WOMENS MULTIVITAMIN PO) Oral    omeprazole (PRILOSEC) 40 mg, Oral, Daily    Wegovy 1 mg, Subcutaneous, Weekly       The following portions of the patient's history were reviewed and updated as appropriate: allergies, current medications, past family history, past medical history, past social history, past surgical history, and problem list.    Objective   Vital Signs:   /98 (BP Location: Right arm, Patient Position: Sitting, Cuff Size: Adult)   Pulse 94   Temp 97.7 øF (36.5 øC) (Temporal)   Ht 172.7 cm (68\")   Wt 82.6 kg (182 lb)   SpO2 97%   BMI 27.67 kg/mý     BP Readings from Last 3 Encounters:   08/18/23 153/98   07/13/23 141/95   06/08/23 148/93     Wt Readings from Last 3 Encounters:   08/18/23 82.6 kg (182 lb)   07/13/23 84.8 kg (187 lb)   06/08/23 84.4 kg (186 lb)           Physical Exam  Constitutional:       Appearance: She is overweight.        Appearance: No acute distress, well-nourished  Head: normocephalic, atraumatic  Eyes: extraocular movements intact, no scleral icterus, no conjunctival injection  Ears, Nose, and Throat: external ears normal, nares patent, moist mucous membranes  Cardiovascular: regular rate and rhythm. no murmurs, rubs, or gallops. no edema  Respiratory: breathing comfortably, symmetric chest rise, clear to auscultation bilaterally. No wheezes, rales, or rhonchi.  Neuro: alert and oriented to time, place, and person. Normal gait  Psych: normal mood and affect     Result Review :   The following data was reviewed by: ROYCE Solis on 08/18/2023:  Common labs          9/12/2022    16:26 2/3/2023    11:16   Common Labs   Glucose 110  106    BUN 8  9    Creatinine 0.80  0.86    Sodium 140  138    Potassium 3.9  4.3  "   Chloride 103  102    Calcium 9.5  9.7    Albumin 4.40  4.5    Total Bilirubin 0.3  0.4    Alkaline Phosphatase 82  80    AST (SGOT) 17  16    ALT (SGPT) 13  19    WBC  7.48    Hemoglobin  12.5    Hematocrit  37.0    Platelets  314    Total Cholesterol 176  186    Triglycerides 286  124    HDL Cholesterol 50  42    LDL Cholesterol  80  122             Lab Results   Component Value Date    COVID19 NOT DETECTED 04/28/2020    POCPREGUR Negative 09/10/2021       Procedures        Assessment and Plan    Diagnoses and all orders for this visit:    1. ABEL (generalized anxiety disorder) (Primary)    2. Primary hypertension  Comments:  will not adjust today; continue current regimen; pt tearful because of breakup with boyfriend.    3. Overweight (BMI 25.0-29.9)  Comments:  continue wegovy ; diet and exercise  Orders:  -     Semaglutide-Weight Management (Wegovy) 1 MG/0.5ML solution auto-injector; Inject 0.5 mL under the skin into the appropriate area as directed 1 (One) Time Per Week.  Dispense: 3 mL; Refill: 0    4. Gastroesophageal reflux disease without esophagitis  Comments:  well controlled          Medications Discontinued During This Encounter   Medication Reason    predniSONE (DELTASONE) 50 MG tablet *Therapy completed    Semaglutide-Weight Management (Wegovy) 0.5 MG/0.5ML solution auto-injector           Follow Up   Return in about 6 weeks (around 9/29/2023) for Hypertension, Weight.  Patient was given instructions and counseling regarding her condition or for health maintenance advice. Please see specific information pulled into the AVS if appropriate.       Fidelina Mcghee, ROYCE  08/18/23  13:26 EDT

## 2023-08-24 DIAGNOSIS — I10 PRIMARY HYPERTENSION: ICD-10-CM

## 2023-08-24 RX ORDER — AMLODIPINE BESYLATE 5 MG/1
5 TABLET ORAL DAILY
Qty: 90 TABLET | OUTPATIENT
Start: 2023-08-24

## 2023-08-24 RX ORDER — AMLODIPINE BESYLATE 5 MG/1
5 TABLET ORAL DAILY
Qty: 30 TABLET | Refills: 1 | Status: SHIPPED | OUTPATIENT
Start: 2023-08-24

## 2023-08-24 NOTE — TELEPHONE ENCOUNTER
Caller: Britany Pyle    Relationship: Self    Best call back number: [phone]    Requested Prescriptions:   Requested Prescriptions     Pending Prescriptions Disp Refills    amLODIPine (NORVASC) 5 MG tablet 30 tablet 1     Sig: Take 1 tablet by mouth Daily.        Pharm[facility] where [facility] be sent: [id number] - [place], [place] - [address] AT [facility] [phone] [facility] [phone] FX     Last office visit with prescribing clinician: 8/18/2023   Last telemedicine visit with prescribing clinician: Visit date not found   Next office visit with prescribing clinician: 9/29/2023     Additional details provided by patient: PATIENT NEEDS THIS FILLED IN A 90 DAY SUPPLY SO INSURANCE WILL COVER IT.     Does the patient have less than a 3 day supply:  [x] Yes  [] No        Glendy Monge Rep   08/24/23 16:09 EDT

## 2023-10-28 DIAGNOSIS — I10 PRIMARY HYPERTENSION: ICD-10-CM

## 2023-10-30 RX ORDER — AMLODIPINE BESYLATE 5 MG/1
5 TABLET ORAL DAILY
Qty: 30 TABLET | Refills: 1 | Status: SHIPPED | OUTPATIENT
Start: 2023-10-30

## 2023-11-02 NOTE — PROGRESS NOTES
Chief Complaint  Hypertension, Obesity, and Eye Problem (Patient wants to talk about her vision )    Subjective          Britany Pyle presents to [facility]  History of Present Illness    Heartburn  She complains of coughing and globus sensation. She reports no abdominal pain, no belching, no chest pain, no choking, no dysphagia, no early satiety, no heartburn, no hoarse voice, no nausea, no sore throat, no stridor, no tooth decay, no water brash or no wheezing. This is a new problem. The current episode started today. Pertinent negatives include no anemia, fatigue, melena, muscle weakness, orthopnea or weight loss. She has tried nothing for the symptoms.     Obesity  This is a chronic problem. Pertinent negatives include no abdominal pain, anorexia, arthralgias, change in bowel habit, chest pain, chills, congestion, coughing, diaphoresis, fatigue, fever, headaches, joint swelling, myalgias, nausea, neck pain, numbness, rash, sore throat, swollen glands, urinary symptoms, vertigo, visual change, vomiting or weakness.     Anxiety  Presents for follow up  visit. Symptoms include nervous/anxious behavior. Patient reports no chest pain, compulsions, confusion, decreased concentration, depressed mood, dizziness, dry mouth, excessive worry, feeling of choking, hyperventilation, impotence, insomnia, irritability, malaise, muscle tension, nausea, obsessions, palpitations, panic, restlessness, shortness of breath or suicidal ideas.       Hypertension   Associated symptoms include anxiety. Pertinent negatives include no blurred vision, chest pain, headaches, malaise/fatigue, neck pain, orthopnea, palpitations, peripheral edema, PND, shortness of breath or sweats. Compliance problems include diet and exercise.  There is no history of angina, kidney disease, CAD/MI, CVA, heart failure, left ventricular hypertrophy, PVD or retinopathy.     Has been going to the gym.   Started  "2.5 weeks ago.   Having hysterectomy on December 7th.     Pain behind her eyes and gets light headed and feels drunk and then it fades away   Feels like she can't breathe through her nose.   Ran a scope and saw ENT and everything is good.       Current Outpatient Medications   Medication Instructions    amLODIPine (NORVASC) 5 mg, Oral, Daily    apixaban (Eliquis) 5 MG tablet tablet TAKE 1 TABLET BY MOUTH TWICE DAILY    busPIRone (BUSPAR) 5 MG tablet TAKE 1 TABLET BY MOUTH THREE TIMES DAILY    fluticasone (Flonase) 50 MCG/ACT nasal spray 2 sprays, Nasal, Daily    Multiple Vitamins-Minerals (WOMENS MULTIVITAMIN PO) Oral    omeprazole (PRILOSEC) 40 mg, Oral, Daily    Rimegepant Sulfate (NURTEC) 75 mg, Oral, Every 48 Hours PRN    Wegovy 1 mg, Subcutaneous, Weekly       The following portions of the patient's history were reviewed and updated as appropriate: allergies, current medications, past family history, past medical history, past social history, past surgical history, and problem list.    Objective   Vital Signs:   /79 (BP Location: Left arm, Patient Position: Sitting, Cuff Size: Adult)   Pulse 86   Temp 98 °F (36.7 °C) (Temporal)   Ht 172.7 cm (68\")   Wt 83.7 kg (184 lb 9.6 oz)   SpO2 98%   BMI 28.07 kg/m²     BP Readings from Last 3 Encounters:   11/03/23 131/79   08/18/23 153/98   07/13/23 141/95     Wt Readings from Last 3 Encounters:   11/03/23 83.7 kg (184 lb 9.6 oz)   08/18/23 82.6 kg (182 lb)   07/13/23 84.8 kg (187 lb)           Physical Exam  Constitutional:       Appearance: She is overweight.          Appearance: No acute distress, well-nourished  Head: normocephalic, atraumatic  Eyes: extraocular movements intact, no scleral icterus, no conjunctival injection  Ears, Nose, and Throat: external ears normal, nares patent, moist mucous membranes  Cardiovascular: regular rate and rhythm. no murmurs, rubs, or gallops. no edema  Respiratory: breathing comfortably, symmetric chest rise, clear to " auscultation bilaterally. No wheezes, rales, or rhonchi.  Neuro: alert and oriented to time, place, and person. Normal gait  Psych: normal mood and affect     Result Review :   The following data was reviewed by: ROYCE Solis on 11/03/2023:  Common labs          2/3/2023    11:16   Common Labs   Glucose 106    BUN 9    Creatinine 0.86    Sodium 138    Potassium 4.3    Chloride 102    Calcium 9.7    Albumin 4.5    Total Bilirubin 0.4    Alkaline Phosphatase 80    AST (SGOT) 16    ALT (SGPT) 19    WBC 7.48    Hemoglobin 12.5    Hematocrit 37.0    Platelets 314    Total Cholesterol 186    Triglycerides 124    HDL Cholesterol 42    LDL Cholesterol  122             Lab Results   Component Value Date    COVID19 NOT DETECTED 04/28/2020    POCPREGUR Negative 09/10/2021       Procedures        Assessment and Plan    Diagnoses and all orders for this visit:    1. Primary hypertension (Primary)  Assessment & Plan:  Hypertension is improving with treatment.  Continue current treatment regimen.  Dietary sodium restriction.  Weight loss.  Regular aerobic exercise.  Continue current medications.  Ambulatory blood pressure monitoring.  Blood pressure will be reassessed in 3 months.      2. Overweight (BMI 25.0-29.9)  Assessment & Plan:  Patient's (Body mass index is 28.07 kg/m².) indicates that they are overweight with health conditions that include hypertension . Weight is improving with lifestyle modifications. BMI is is above average; BMI management plan is completed. We discussed low calorie, low carb based diet program, portion control, and increasing exercise.       3. ABEL (generalized anxiety disorder)    4. Gastroesophageal reflux disease without esophagitis    5. Personal history of DVT (deep vein thrombosis)  Assessment & Plan:  Reviewed scan.   Will plan to repeat venous doppler at 1 year to check for improvement. This will be march 2024.     Orders:  -     Duplex Venous Lower Extremity - Left CAR;  Future    6. Other migraine without status migrainosus, not intractable  Comments:  will trial nurtec.  Orders:  -     Rimegepant Sulfate (NURTEC) 75 MG tablet dispersible tablet; Take 1 tablet by mouth Every Other Day As Needed (migraine).  Dispense: 13 tablet; Refill: 0      Hyperintense signal on MRI will repeat MRI at one year.       There are no discontinued medications.       Follow Up   Return in about 3 months (around 1/30/2024) for Hypertension.  Patient was given instructions and counseling regarding her condition or for health maintenance advice. Please see specific information pulled into the AVS if appropriate.       Fidelina Mcghee, APRN  11/07/23  08:08 EST

## 2023-11-03 ENCOUNTER — OFFICE VISIT (OUTPATIENT)
Dept: INTERNAL MEDICINE | Facility: CLINIC | Age: 48
End: 2023-11-03
Payer: COMMERCIAL

## 2023-11-03 VITALS
HEART RATE: 86 BPM | SYSTOLIC BLOOD PRESSURE: 131 MMHG | TEMPERATURE: 98 F | DIASTOLIC BLOOD PRESSURE: 79 MMHG | HEIGHT: 68 IN | OXYGEN SATURATION: 98 % | WEIGHT: 184.6 LBS | BODY MASS INDEX: 27.98 KG/M2

## 2023-11-03 DIAGNOSIS — G43.809 OTHER MIGRAINE WITHOUT STATUS MIGRAINOSUS, NOT INTRACTABLE: ICD-10-CM

## 2023-11-03 DIAGNOSIS — Z86.718 PERSONAL HISTORY OF DVT (DEEP VEIN THROMBOSIS): ICD-10-CM

## 2023-11-03 DIAGNOSIS — K21.9 GASTROESOPHAGEAL REFLUX DISEASE WITHOUT ESOPHAGITIS: ICD-10-CM

## 2023-11-03 DIAGNOSIS — I10 PRIMARY HYPERTENSION: Primary | ICD-10-CM

## 2023-11-03 DIAGNOSIS — F41.1 GAD (GENERALIZED ANXIETY DISORDER): ICD-10-CM

## 2023-11-03 DIAGNOSIS — E66.3 OVERWEIGHT (BMI 25.0-29.9): ICD-10-CM

## 2023-11-07 NOTE — ASSESSMENT & PLAN NOTE
Hypertension is improving with treatment.  Continue current treatment regimen.  Dietary sodium restriction.  Weight loss.  Regular aerobic exercise.  Continue current medications.  Ambulatory blood pressure monitoring.  Blood pressure will be reassessed in 3 months.

## 2023-11-07 NOTE — ASSESSMENT & PLAN NOTE
Patient's (Body mass index is 28.07 kg/m².) indicates that they are overweight with health conditions that include hypertension . Weight is improving with lifestyle modifications. BMI is is above average; BMI management plan is completed. We discussed low calorie, low carb based diet program, portion control, and increasing exercise.

## 2023-11-07 NOTE — ASSESSMENT & PLAN NOTE
Reviewed scan.   Will plan to repeat venous doppler at 1 year to check for improvement. This will be march 2024.

## 2023-11-17 ENCOUNTER — PRIOR AUTHORIZATION (OUTPATIENT)
Dept: INTERNAL MEDICINE | Facility: CLINIC | Age: 48
End: 2023-11-17
Payer: COMMERCIAL

## 2023-11-22 ENCOUNTER — TRANSCRIBE ORDERS (OUTPATIENT)
Dept: GENERAL RADIOLOGY | Facility: HOSPITAL | Age: 48
End: 2023-11-22
Payer: COMMERCIAL

## 2023-11-22 DIAGNOSIS — N63.10 MASS OF RIGHT BREAST, UNSPECIFIED QUADRANT: Primary | ICD-10-CM

## 2023-11-27 DIAGNOSIS — I10 PRIMARY HYPERTENSION: ICD-10-CM

## 2023-11-27 RX ORDER — AMLODIPINE BESYLATE 5 MG/1
5 TABLET ORAL DAILY
Qty: 90 TABLET | Refills: 0 | Status: SHIPPED | OUTPATIENT
Start: 2023-11-27

## 2023-12-04 ENCOUNTER — HOSPITAL ENCOUNTER (OUTPATIENT)
Dept: MAMMOGRAPHY | Facility: HOSPITAL | Age: 48
Discharge: HOME | End: 2023-12-04
Admitting: OBSTETRICS & GYNECOLOGY
Payer: COMMERCIAL

## 2023-12-04 ENCOUNTER — HOSPITAL ENCOUNTER (OUTPATIENT)
Dept: ULTRASOUND IMAGING | Facility: HOSPITAL | Age: 48
Discharge: HOME | End: 2023-12-04
Payer: COMMERCIAL

## 2023-12-04 DIAGNOSIS — N63.10 MASS OF RIGHT BREAST, UNSPECIFIED QUADRANT: ICD-10-CM

## 2023-12-04 PROCEDURE — G0279 TOMOSYNTHESIS, MAMMO: HCPCS

## 2023-12-04 PROCEDURE — 76642 ULTRASOUND BREAST LIMITED: CPT

## 2023-12-04 PROCEDURE — 77066 DX MAMMO INCL CAD BI: CPT

## 2023-12-06 ENCOUNTER — HOSPITAL ENCOUNTER (OUTPATIENT)
Dept: MAMMOGRAPHY | Facility: HOSPITAL | Age: 48
Discharge: HOME | End: 2023-12-06
Payer: COMMERCIAL

## 2023-12-06 ENCOUNTER — PATIENT OUTREACH (OUTPATIENT)
Dept: ONCOLOGY | Facility: HOSPITAL | Age: 48
End: 2023-12-06
Payer: COMMERCIAL

## 2023-12-06 DIAGNOSIS — N63.0 BREAST MASS IN FEMALE: ICD-10-CM

## 2023-12-06 PROCEDURE — A4648 IMPLANTABLE TISSUE MARKER: HCPCS

## 2023-12-06 PROCEDURE — 25010000002 LIDOCAINE 1 % SOLUTION: Performed by: NURSE PRACTITIONER

## 2023-12-06 RX ORDER — LIDOCAINE HYDROCHLORIDE 10 MG/ML
10 INJECTION, SOLUTION INFILTRATION; PERINEURAL ONCE
Status: COMPLETED | OUTPATIENT
Start: 2023-12-06 | End: 2023-12-06

## 2023-12-06 RX ORDER — LIDOCAINE HYDROCHLORIDE AND EPINEPHRINE 10; 10 MG/ML; UG/ML
10 INJECTION, SOLUTION INFILTRATION; PERINEURAL ONCE
Status: COMPLETED | OUTPATIENT
Start: 2023-12-06 | End: 2023-12-06

## 2023-12-06 RX ADMIN — LIDOCAINE HYDROCHLORIDE 10 ML: 10 INJECTION, SOLUTION INFILTRATION; PERINEURAL at 14:42

## 2023-12-06 RX ADMIN — LIDOCAINE HYDROCHLORIDE,EPINEPHRINE BITARTRATE 10 ML: 10; .01 INJECTION, SOLUTION INFILTRATION; PERINEURAL at 14:42

## 2023-12-11 LAB
CYTO UR: NORMAL
LAB AP CASE REPORT: NORMAL
LAB AP CLINICAL INFORMATION: NORMAL
LAB AP SPECIAL STAINS: NORMAL
LAB AP SYNOPTIC CHECKLIST: NORMAL
PATH REPORT.FINAL DX SPEC: NORMAL
PATH REPORT.GROSS SPEC: NORMAL

## 2023-12-12 ENCOUNTER — PATIENT OUTREACH (OUTPATIENT)
Dept: ONCOLOGY | Facility: HOSPITAL | Age: 48
End: 2023-12-12
Payer: COMMERCIAL

## 2023-12-12 DIAGNOSIS — C50.911 MALIGNANT NEOPLASM OF RIGHT FEMALE BREAST, UNSPECIFIED ESTROGEN RECEPTOR STATUS, UNSPECIFIED SITE OF BREAST: Primary | ICD-10-CM

## 2023-12-12 RX ORDER — DIPHENOXYLATE HYDROCHLORIDE AND ATROPINE SULFATE 2.5; .025 MG/1; MG/1
2 TABLET ORAL DAILY
COMMUNITY

## 2023-12-12 RX ORDER — AZELASTINE 1 MG/ML
2 SPRAY, METERED NASAL 2 TIMES DAILY
COMMUNITY
Start: 2023-12-04

## 2023-12-12 RX ORDER — CELECOXIB 200 MG/1
CAPSULE ORAL
COMMUNITY
Start: 2023-12-07

## 2023-12-12 RX ORDER — OXYCODONE HYDROCHLORIDE AND ACETAMINOPHEN 5; 325 MG/1; MG/1
TABLET ORAL
COMMUNITY
Start: 2023-12-07

## 2023-12-12 RX ORDER — CYCLOBENZAPRINE HCL 10 MG
TABLET ORAL
COMMUNITY
Start: 2023-12-07

## 2023-12-12 RX ORDER — FLUCONAZOLE 150 MG/1
150 TABLET ORAL
COMMUNITY
Start: 2023-11-26

## 2023-12-12 RX ORDER — ONDANSETRON 4 MG/1
TABLET, FILM COATED ORAL
COMMUNITY
Start: 2023-12-07

## 2023-12-14 ENCOUNTER — PATIENT OUTREACH (OUTPATIENT)
Dept: ONCOLOGY | Facility: HOSPITAL | Age: 48
End: 2023-12-14
Payer: COMMERCIAL

## 2023-12-14 ENCOUNTER — CONSULT (OUTPATIENT)
Dept: ONCOLOGY | Facility: HOSPITAL | Age: 48
End: 2023-12-14
Payer: COMMERCIAL

## 2023-12-14 ENCOUNTER — OFFICE VISIT (OUTPATIENT)
Dept: OTHER | Facility: HOSPITAL | Age: 48
End: 2023-12-14
Payer: COMMERCIAL

## 2023-12-14 VITALS
HEIGHT: 68 IN | TEMPERATURE: 98 F | SYSTOLIC BLOOD PRESSURE: 113 MMHG | DIASTOLIC BLOOD PRESSURE: 70 MMHG | OXYGEN SATURATION: 98 % | HEART RATE: 95 BPM | RESPIRATION RATE: 16 BRPM | WEIGHT: 182.54 LBS | BODY MASS INDEX: 27.67 KG/M2

## 2023-12-14 DIAGNOSIS — C50.411 MALIGNANT NEOPLASM OF UPPER-OUTER QUADRANT OF RIGHT BREAST IN FEMALE, ESTROGEN RECEPTOR POSITIVE: Primary | ICD-10-CM

## 2023-12-14 DIAGNOSIS — C50.911 MALIGNANT NEOPLASM OF RIGHT BREAST IN FEMALE, ESTROGEN RECEPTOR POSITIVE, UNSPECIFIED SITE OF BREAST: Primary | ICD-10-CM

## 2023-12-14 DIAGNOSIS — Z17.0 MALIGNANT NEOPLASM OF RIGHT BREAST IN FEMALE, ESTROGEN RECEPTOR POSITIVE, UNSPECIFIED SITE OF BREAST: Primary | ICD-10-CM

## 2023-12-14 DIAGNOSIS — Z17.0 MALIGNANT NEOPLASM OF UPPER-OUTER QUADRANT OF RIGHT BREAST IN FEMALE, ESTROGEN RECEPTOR POSITIVE: Primary | ICD-10-CM

## 2023-12-14 PROCEDURE — 99204 OFFICE O/P NEW MOD 45 MIN: CPT | Performed by: SURGERY

## 2023-12-14 PROCEDURE — G0463 HOSPITAL OUTPT CLINIC VISIT: HCPCS | Performed by: INTERNAL MEDICINE

## 2023-12-14 RX ORDER — DIAZEPAM 5 MG/1
5 TABLET ORAL
COMMUNITY
Start: 2023-12-14

## 2023-12-14 NOTE — PROGRESS NOTES
Chief Complaint: No chief complaint on file.    Subjective         History of Present Illness  Britany Pyle is a 47 y.o. female presents to [facility] to be seen for right breast cancer.  Her pathology and imaging are shown below:    Clinical Information    Right breast mass   Final Diagnosis   Right breast, 10 o'clock position, palpable, ultrasound-guided core biopsy:  - Invasive lobular carcinoma  - Histologic grade ([place] Histologic Score):    - Glandular (Acinar)/Tubular Differentiation:  Score 3  - Nuclear Pleomorphism:  Score 2  - Mitotic Rate:  Score 1  - Overall Grade:  Grade 2               - Size of largest focus of invasion: 11 mm               - Breast biomarker testing:                            - Estrogen Receptor (ER):  Positive (95%, strong)                            - Progesterone Receptor (PgR):  Positive (95%, strong)                            - HER2 (by immunohistochemistry):  Negative (Score 1+)  - Ki-67: 20%               - Other findings:                            - Lobular carcinoma in situ (LCIS), classic type        Comment:  Dr. Mcmullen and JUNIOR QuigleyN, RN were notified of the above positive (malignant) diagnosis at 11:54 EST and 10:00 EST, respectively, on 12/11/2023 by MEIR and Dr. Mcallister, respectively.      Electronically signed by Mela Mcallister MD on 12/11/2023 at 1259   Synoptic Checklist   Breast Biomarker Reporting Template   Protocol posted: 3/22/2023BREAST: BIOMARKER REPORTING TEMPLATE - All Specimens  Test(s) Performed    Estrogen Receptor (ER) Status Positive (greater than 10% of cells demonstrate nuclear positivity)   Percentage of Cells with Nuclear Positivity 95 %   Average Intensity of Staining Strong   Test Type Food and Drug Administration (FDA) cleared (test / vendor): Roche   Primary Antibody SP1   Test(s) Performed    Progesterone Receptor (PgR) Status Positive   Percentage of Cells with Nuclear  Positivity 95 %   Average Intensity of Staining Strong   Test Type Food and Drug Administration (FDA) cleared (test / vendor): Roche   Primary Antibody 1E2   Test(s) Performed    HER2 by Immunohistochemistry Negative (Score 1+)   Test Type Food and Drug Administration (FDA) cleared (test / vendor): Roche   Primary Antibody 4B5   Test(s) Performed Ki-67   Ki-67 Percentage of Positive Nuclei 20 %   Primary Antibody 30-9   Cold Ischemia and Fixation Times Meet requirements specified in latest version of the ASCO / CAP Guidelines   .            Narrative & Impression   PROCEDURE:  MAMMO DIAGNOSTIC DIGITAL TOMOSYNTHESIS BILATERAL W CAD     COMPARISON:  [facility], US, US BREAST RIGHT LIMITED, 12/04/2023, 13:42.    [facility], MG, MAMMO DIAGNOSTIC DIGITAL TOMOSYNTHESIS BILATERAL W CAD,   12/17/2021, 8:55.  [facility], MG, DIG SCREENING BILAT SANG W 3D YOSSI,   7/10/2019, 12:50.  [facility], MG, MAMMO SCREENING DIGITAL TOMOSYNTHESIS   BILATERAL W CAD, 4/07/2023, 13:45.     VIEWS:  DIAGNOSTIC VIEWS WERE OBTAINED UTILIZING 3D TOMOSYNTHESIS AND R2 CAD SOFTWARE     INDICATIONS:  Right breast lump X 1 month/ bilateral breast tenderness.     VIEWS:              Routine views of both breasts.  Bilateral CC and MLO focal compression views.  Bilateral   mL views.  Focused ultrasound examination of the right breast and right axilla.     FINDINGS:          No suspicious masses, areas of architectural distortion or suspicious microcalcifications are identified in the left breast.  There is subtle architectural distortion in the upper outer right breast corresponding to the patient's palpable abnormality.  No suspicious microcalcifications are   identified.     Focused ultrasound examination of the 10 o'clock position of the right breast at the site of the palpable abnormality described by the patient demonstrates a 1.4 cm irregular area of decreased  echogenicity compared to the surrounding breast parenchyma.  No cystic masses are identified.  No   evidence of adenopathy in the right axilla.     IMPRESSION:     1. Right breast:  Suspicious 1.4 cm irregular area of decreased echogenicity in the 10 o'clock position of the right breast at the site of the palpable abnormality described by the patient.    Recommend ultrasound-guided core biopsy.     2. Left breast:  Benign left mammogram.     Report called by Rocio at the Radiology .     RECOMMENDATION(S):               ULTRASOUND-GUIDED CORE BIOPSY: RIGHT BREAST       BIRADS:               DIAGNOSTIC CATEGORY 4--SUSPICIOUS       BREAST COMPOSITION:          Heterogeneously dense,which may obscure small masses.       Objective     Past Medical History:   Diagnosis Date    Abnormal uterine and vaginal bleeding, unspecified 7/24/2019    Last Assessment & Plan:  One month history of menometrorrhagia Primarily here for prolapse  TVUS/SIS and endometrial thickness. Discussed SIS. The risks, benefits, complications, treatment options/alternatives, and expected outcomes/goals were discussed with the patient including risk of pain, perforation, bleeding, infection, and failure to diagnose. Antibiotics if evidence of hydrosalpinx due to    Anxiety     Broken bones 2009    Deep vein thrombosis (DVT) associated with COVID-19 10/7/2022    Hypertension     Localized edema 10/7/2022    Migraine headache     Pneumonia due to COVID-19 virus 2/3/2022    SOB (shortness of breath) 2/23/2022    Uterovaginal prolapse, incomplete 5/9/2016    Last Assessment & Plan:  c = 0 Ba = -3 Bp = -3    Vision disturbance 9/12/2022       Past Surgical History:   Procedure Laterality Date    COLONOSCOPY      LASER ABLATION  04/18/2023    LEG SURGERY      right leg    TUBAL ABDOMINAL LIGATION  04/18/2023         Current Outpatient Medications:     azelastine (ASTELIN) 0.1 % nasal spray, 2 sprays 2 (Two) Times a Day., Disp: , Rfl:      celecoxib (CeleBREX) 200 MG capsule, , Disp: , Rfl:     cyclobenzaprine (FLEXERIL) 10 MG tablet, , Disp: , Rfl:     fluconazole (DIFLUCAN) 150 MG tablet, Take 1 tablet by mouth Every 72 (Seventy-Two) Hours., Disp: , Rfl:     ondansetron (ZOFRAN) 4 MG tablet, , Disp: , Rfl:     oxyCODONE-acetaminophen (PERCOCET) 5-325 MG per tablet, , Disp: , Rfl:     amLODIPine (NORVASC) 5 MG tablet, TAKE 1 TABLET BY MOUTH EVERY DAY, Disp: 90 tablet, Rfl: 0    apixaban (Eliquis) 5 MG tablet tablet, TAKE 1 TABLET BY MOUTH TWICE DAILY, Disp: 180 tablet, Rfl: 1    busPIRone (BUSPAR) 5 MG tablet, TAKE 1 TABLET BY MOUTH THREE TIMES DAILY, Disp: 90 tablet, Rfl: 0    ELDERBERRY PO, Take 1 each by mouth Daily., Disp: , Rfl:     fluticasone (Flonase) 50 MCG/ACT nasal spray, 2 sprays into the nostril(s) as directed by provider Daily., Disp: 16 g, Rfl: 5    Multiple Vitamins-Minerals (WOMENS MULTIVITAMIN PO), Take  by mouth., Disp: , Rfl:     multivitamin (THERAGRAN) tablet tablet, Take 2 tablets by mouth Daily., Disp: , Rfl:     omeprazole (priLOSEC) 40 MG capsule, Take 1 capsule by mouth Daily., Disp: 90 capsule, Rfl: 1    Rimegepant Sulfate (NURTEC) 75 MG tablet dispersible tablet, Take 1 tablet by mouth Every Other Day As Needed (migraine)., Disp: 13 tablet, Rfl: 0    Semaglutide-Weight Management (Wegovy) 1 MG/0.5ML solution auto-injector, Inject 0.5 mL under the skin into the appropriate area as directed 1 (One) Time Per Week. (Patient not taking: Reported on 11/3/2023), Disp: 3 mL, Rfl: 0    No Known Allergies     Family History   Problem Relation Age of Onset    Hypertension Mother        Social History     Socioeconomic History    Marital status:    Tobacco Use    Smoking status: Never    Smokeless tobacco: Never   Vaping Use    Vaping Use: Never used   Substance and Sexual Activity    Alcohol use: Yes     Comment: only occasionally - maybe once or twice a month    Drug use: Never    Sexual activity: Yes     Partners: Male      Birth control/protection: Condom, Spermicide       Vital Signs:   There were no vitals taken for this visit.   Review of Systems    Physical Exam  Vitals and nursing note reviewed.   Constitutional:       Appearance: Normal appearance.   HENT:      Head: Normocephalic and atraumatic.   Eyes:      Extraocular Movements: Extraocular movements intact.      Pupils: Pupils are equal, round, and reactive to light.   Cardiovascular:      Pulses: Normal pulses.   Pulmonary:      Effort: Pulmonary effort is normal. No accessory muscle usage or respiratory distress.   Chest:   Breasts:     Right: Normal. Mass present. No inverted nipple, nipple discharge or skin change.      Left: Normal. No inverted nipple, mass, nipple discharge or skin change.   Abdominal:      General: Abdomen is flat.      Palpations: Abdomen is soft.      Tenderness: There is no abdominal tenderness. There is no guarding.   Musculoskeletal:         General: No swelling, tenderness or deformity.      Cervical back: Neck supple.   Lymphadenopathy:      Upper Body:      Right upper body: No supraclavicular or axillary adenopathy.      Left upper body: No supraclavicular or axillary adenopathy.   Skin:     General: Skin is warm and dry.   Neurological:      General: No focal deficit present.      Mental Status: She is alert and oriented to person, place, and time.   Psychiatric:         Mood and Affect: Mood normal.         Thought Content: Thought content normal.        Result Review :               Assessment and Plan    Diagnoses and all orders for this visit:    1. Malignant neoplasm of right breast in female, estrogen receptor positive, unspecified site of breast (Primary)    Will get genetic testing  Will send her to discuss options with rad onc and plastic surgery -- discussed all surgical options today   Needs MRI     Follow Up   No follow-ups on file.  Patient was given instructions and counseling regarding her condition or for health maintenance  advice. Please see specific information pulled into the AVS if appropriate.         This document has been electronically signed by Trinh Kelly MD  December 14, 2023 12:24 EST

## 2023-12-14 NOTE — ASSESSMENT & PLAN NOTE
Patient has biopsy-proven lobular carcinoma of the right breast.  This is a new diagnosis and is life-threatening if left untreated.  I discussed the case with Dr. Kelly, surgery who is ordering MRI to further evaluate the breast.  Patient is considering her surgical options mastectomy (unilateral versus bilateral) +/- reconstruction versus lumpectomy with radiation.  She is hormone receptor positive.  Premenopausal.  We discussed adjuvant hormone therapy with either tamoxifen versus AI and ovarian suppression.  For node-negative, hormone receptor positive breast cancer I would recommend Oncotype DX to elucidate the role of chemotherapy for this patient.  We discussed genetic testing given her young age and family history.  Patient reports that she already has genetic testing set up through her gynecologist and she will defer to that appointment.  I will see her back in the postoperative setting to review her pathology and finalize adjuvant treatment planning.

## 2023-12-14 NOTE — PROGRESS NOTES
Chief Complaint  Breast Cancer    LitzyFidelina A*  Fidelina Mcghee APRN Subjective Stacey M Holbrook presents to [facility] for evaluation of newly diagnosed right breast cancer.  Patient reports that she noticed a lump in her breast a couple weeks ago at the time of hysterectomy which she had for uterine prolapse.  Her gynecologist agreed that she had an abnormality in the right breast.  Subsequent diagnostic mammogram showed a subtle abnormality at the site of the palpable lesion-of note she had a screening mammogram 4/23 which was negative.  Subsequent ultrasound demonstrated a 1.4 cm irregular mass in the right breast.  No axillary adenopathy identified.  Biopsy recommended.  She underwent biopsy demonstrating lobular carcinoma ER positive, NM positive, HER2 negative along with LCIS.  She denies other masses or adenopathy.  No unusual aches or pains.  She is otherwise in good health.  She saw Dr. Kelly with surgery earlier today.  I discussed the case with her.  She will plan MRI to further evaluate both breasts.  Patient is weighing her options with regard to surgery.  Prior to her hysterectomy, she had a normal menstrual cycle.  They did not take her ovaries at the time of hysterectomy.  She reports a maternal aunts with breast cancer in her 70s.  She presents for discussion of potential adjuvant treatments.    Oncology/Hematology History   Malignant neoplasm of upper-outer quadrant of right breast in female, estrogen receptor positive   12/14/2023 Initial Diagnosis    Malignant neoplasm of upper-outer quadrant of right breast in female, estrogen receptor positive     12/14/2023 Cancer Staged    Staging form: Breast, AJCC 8th Edition  - Clinical: Stage IA (cT1c, cN0, cM0, G2, ER+, NM+, HER2-) - Signed by Gerardo Flores MD on 12/14/2023         Review of Systems   Constitutional:  Positive for fatigue. Negative for appetite change,  diaphoresis, fever, unexpected weight gain and unexpected weight loss.   HENT:  Negative for hearing loss, mouth sores, sore throat, swollen glands, trouble swallowing and voice change.    Eyes:  Negative for blurred vision.   Respiratory:  Negative for cough, shortness of breath and wheezing.    Cardiovascular:  Negative for chest pain and palpitations.   Gastrointestinal:  Negative for abdominal pain, blood in stool, constipation, diarrhea, nausea and vomiting.   Endocrine: Negative for cold intolerance and heat intolerance.   Genitourinary:  Negative for difficulty urinating, dysuria, frequency, hematuria and urinary incontinence.   Musculoskeletal:  Negative for arthralgias, back pain and myalgias.   Skin:  Negative for rash, skin lesions and wound.   Neurological:  Negative for dizziness, seizures, weakness, numbness and headache.   Hematological:  Does not bruise/bleed easily.   Psychiatric/Behavioral:  Positive for depressed mood. The patient is nervous/anxious.      Current Outpatient Medications on File Prior to Visit   Medication Sig Dispense Refill    diazePAM (VALIUM) 5 MG tablet Take 1 tablet by mouth.      amLODIPine (NORVASC) 5 MG tablet TAKE 1 TABLET BY MOUTH EVERY DAY 90 tablet 0    apixaban (Eliquis) 5 MG tablet tablet TAKE 1 TABLET BY MOUTH TWICE DAILY 180 tablet 1    azelastine (ASTELIN) 0.1 % nasal spray 2 sprays 2 (Two) Times a Day.      busPIRone (BUSPAR) 5 MG tablet TAKE 1 TABLET BY MOUTH THREE TIMES DAILY 90 tablet 0    celecoxib (CeleBREX) 200 MG capsule       cyclobenzaprine (FLEXERIL) 10 MG tablet       ELDERBERRY PO Take 1 each by mouth Daily.      fluconazole (DIFLUCAN) 150 MG tablet Take 1 tablet by mouth Every 72 (Seventy-Two) Hours.      fluticasone (Flonase) 50 MCG/ACT nasal spray 2 sprays into the nostril(s) as directed by provider Daily. 16 g 5    Multiple Vitamins-Minerals (WOMENS MULTIVITAMIN PO) Take  by mouth.      multivitamin (THERAGRAN) tablet tablet Take 2 tablets by mouth  Daily.      omeprazole (priLOSEC) 40 MG capsule Take 1 capsule by mouth Daily. 90 capsule 1    ondansetron (ZOFRAN) 4 MG tablet       oxyCODONE-acetaminophen (PERCOCET) 5-325 MG per tablet       Rimegepant Sulfate (NURTEC) 75 MG tablet dispersible tablet Take 1 tablet by mouth Every Other Day As Needed (migraine). 13 tablet 0    Semaglutide-Weight Management (Wegovy) 1 MG/0.5ML solution auto-injector Inject 0.5 mL under the skin into the appropriate area as directed 1 (One) Time Per Week. (Patient not taking: Reported on 11/3/2023) 3 mL 0     No current facility-administered medications on file prior to visit.       No Known Allergies  Past Medical History:   Diagnosis Date    Abnormal uterine and vaginal bleeding, unspecified 07/24/2019    Last Assessment & Plan:  One month history of menometrorrhagia Primarily here for prolapse  TVUS/SIS and endometrial thickness. Discussed SIS. The risks, benefits, complications, treatment options/alternatives, and expected outcomes/goals were discussed with the patient including risk of pain, perforation, bleeding, infection, and failure to diagnose. Antibiotics if evidence of hydrosalpinx due to    Anxiety     Breast cancer     Broken bones 2009    Deep vein thrombosis (DVT) associated with COVID-19 10/07/2022    Hypertension     Localized edema 10/07/2022    Migraine headache     Pneumonia due to COVID-19 virus 02/03/2022    SOB (shortness of breath) 02/23/2022    Uterovaginal prolapse, incomplete 05/09/2016    Last Assessment & Plan:  c = 0 Ba = -3 Bp = -3    Vision disturbance 09/12/2022     Past Surgical History:   Procedure Laterality Date    COLONOSCOPY      LASER ABLATION  04/18/2023    LEG SURGERY      right leg    TUBAL ABDOMINAL LIGATION  04/18/2023     Social History     Socioeconomic History    Marital status:    Tobacco Use    Smoking status: Never    Smokeless tobacco: Never   Vaping Use    Vaping Use: Never used   Substance and Sexual Activity    Alcohol  "use: Yes     Comment: only occasionally - maybe once or twice a month    Drug use: Never    Sexual activity: Yes     Partners: Male     Birth control/protection: Condom, Spermicide     Family History   Problem Relation Age of Onset    Hypertension Mother     Breast cancer Maternal Aunt     Lung cancer Maternal Grandfather        Objective   Physical Exam  Vitals reviewed. Exam conducted with a chaperone present.   Constitutional:       General: She is not in acute distress.     Appearance: Normal appearance.   Cardiovascular:      Rate and Rhythm: Normal rate and regular rhythm.      Heart sounds: Normal heart sounds. No murmur heard.     No gallop.   Pulmonary:      Effort: Pulmonary effort is normal.      Breath sounds: Normal breath sounds.   Chest:   Breasts:     Right: Mass (See diagram) present.      Left: Normal.       Abdominal:      General: Abdomen is flat. Bowel sounds are normal.      Palpations: Abdomen is soft.   Musculoskeletal:      Cervical back: Neck supple.      Right lower leg: No edema.      Left lower leg: No edema.   Lymphadenopathy:      Cervical: No cervical adenopathy.      Upper Body:      Right upper body: No supraclavicular or axillary adenopathy.      Left upper body: No supraclavicular or axillary adenopathy.   Neurological:      Mental Status: She is alert and oriented to person, place, and time.   Psychiatric:         Mood and Affect: Mood normal.         Behavior: Behavior normal.         Vitals:    12/14/23 1503   BP: 113/70   Pulse: 95   Resp: 16   Temp: 98 °F (36.7 °C)   TempSrc: Temporal   SpO2: 98%   Weight: 82.8 kg (182 lb 8.7 oz)   Height: 172.7 cm (67.99\")   PainSc: 0-No pain     ECOG score: 0         PHQ-9 Total Score:                    Result Review :   The following data was reviewed by: Gerardo Flores MD on 12/14/2023:  Lab Results   Component Value Date    HGB 13.2 11/10/2023    HCT 39.5 11/10/2023    MCV 90.0 11/10/2023     11/10/2023    WBC 7.79 11/10/2023 " "   NEUTROABS 5.29 11/10/2023    LYMPHSABS 1.79 11/10/2023    MONOSABS 0.58 11/10/2023    EOSABS 0.04 11/10/2023    BASOSABS 0.06 11/10/2023     Lab Results   Component Value Date    GLUCOSE 106 (H) 02/03/2023    BUN 9 02/03/2023    CREATININE 0.86 02/03/2023     02/03/2023    K 4.3 02/03/2023     02/03/2023    CO2 27.3 02/03/2023    CALCIUM 9.7 02/03/2023    PROTEINTOT 7.7 02/03/2023    ALBUMIN 4.5 02/03/2023    BILITOT 0.4 02/03/2023    ALKPHOS 80 02/03/2023    AST 16 02/03/2023    ALT 19 02/03/2023     Lab Results   Component Value Date    FREET4 1.2 06/25/2019    TSH 2.600 02/03/2023     No results found for: \"IRON\", \"LABIRON\", \"TRANSFERRIN\", \"TIBC\"  Lab Results   Component Value Date    UCSOBJAK53 384 05/08/2020    FOLATE 19.00 05/08/2020     No results found for: \"PSA\", \"CEA\", \"AFP\", \"\", \"\"    Data reviewed : Radiologic studies mammogram and ultrasound images reviewed demonstrating 1.4 cm mass in the right breast.  Pathology report from right breast biopsy reviewed       Assessment and Plan    Diagnoses and all orders for this visit:    1. Malignant neoplasm of upper-outer quadrant of right breast in female, estrogen receptor positive (Primary)  Assessment & Plan:  Patient has biopsy-proven lobular carcinoma of the right breast.  This is a new diagnosis and is life-threatening if left untreated.  I discussed the case with Dr. Kelly, surgery who is ordering MRI to further evaluate the breast.  Patient is considering her surgical options mastectomy (unilateral versus bilateral) +/- reconstruction versus lumpectomy with radiation.  She is hormone receptor positive.  Premenopausal.  We discussed adjuvant hormone therapy with either tamoxifen versus AI and ovarian suppression.  For node-negative, hormone receptor positive breast cancer I would recommend Oncotype DX to elucidate the role of chemotherapy for this patient.  We discussed genetic testing given her young age and family history.  " Patient reports that she already has genetic testing set up through her gynecologist and she will defer to that appointment.  I will see her back in the postoperative setting to review her pathology and finalize adjuvant treatment planning.          I spent 68 minutes caring for Britany on this date of service. This time includes time spent by me in the following activities:preparing for the visit, reviewing tests, obtaining and/or reviewing a separately obtained history, performing a medically appropriate examination and/or evaluation , counseling and educating the patient/family/caregiver, ordering medications, tests, or procedures, referring and communicating with other health care professionals , documenting information in the medical record, independently interpreting results and communicating that information with the patient/family/caregiver, and care coordination    Patient Follow Up: After surgery    Patient was given instructions and counseling regarding her condition or for health maintenance advice. Please see specific information pulled into the AVS if appropriate.     Gerardo Flores MD    12/14/2023             C/w Invanz   CT A/P tomorrow to evaluate renal abscess  Check EMG, neuro eval ?ALS  failed MBS, pt wants to eat, soft diet, ENT eval

## 2023-12-20 ENCOUNTER — TELEPHONE (OUTPATIENT)
Dept: ONCOLOGY | Facility: HOSPITAL | Age: 48
End: 2023-12-20
Payer: COMMERCIAL

## 2023-12-20 NOTE — TELEPHONE ENCOUNTER
OSW attempted to contact patient to review her barriers to care or unmet needs regarding financial, physical health, spiritual health, or emotional health. Patient did not answer the call but OSW was able to leave her contact information. A message was sent in [employer] to patient.

## 2023-12-22 ENCOUNTER — TELEPHONE (OUTPATIENT)
Dept: ONCOLOGY | Facility: HOSPITAL | Age: 48
End: 2023-12-22
Payer: COMMERCIAL

## 2023-12-22 NOTE — TELEPHONE ENCOUNTER
OSW contacted patient to follow up after her visit to the [facility]. Patient was very pleasant and easy to engage. Patient stated she has met with two surgeons and decided to have her care completed at [facility]. Patient stated she received good care but wanted to stay in the healthcare system that found and referred her to treatment. OSW thanked patient for taking her call and educated her that oncology social workers at [facility] could assist with completion of filing for her cancer policy claims and financial resources to address any barriers to care or unmet needs. Patient thanked OSW for following up on her care.

## 2023-12-27 ENCOUNTER — HOSPITAL ENCOUNTER (OUTPATIENT)
Dept: MRI IMAGING | Facility: HOSPITAL | Age: 48
Discharge: HOME | End: 2023-12-27
Payer: COMMERCIAL

## 2023-12-27 DIAGNOSIS — K21.9 GASTROESOPHAGEAL REFLUX DISEASE WITHOUT ESOPHAGITIS: ICD-10-CM

## 2023-12-27 RX ORDER — OMEPRAZOLE 40 MG/1
40 CAPSULE, DELAYED RELEASE ORAL DAILY
Qty: 90 CAPSULE | Refills: 1 | Status: SHIPPED | OUTPATIENT
Start: 2023-12-27

## 2024-01-12 ENCOUNTER — TELEPHONE (OUTPATIENT)
Dept: ONCOLOGY | Facility: HOSPITAL | Age: 49
End: 2024-01-12
Payer: COMMERCIAL

## 2024-01-12 NOTE — TELEPHONE ENCOUNTER
SPOKE WITH PATIENT TO GET HER SCHEDULED FOR A 3 WEEK FOLLOW UP POST SURGERY WITH DR. BENITEZ. PATIENT STATES SHE ENDED UP TAKING A DIFFERENT ROUTE AND GETTING SURGERY/ FOLLOW UP IN [place]. PATIENT VERBALIZED THAT SHE APPRECIATED DR. BENITEZ'S CARE HERE, BUT DOES NOT NEED A FOLLOW UP AT THIS TIME. PATIENT ENCOURAGED TO CALL US IF SHE NEEDS ANYTHING IN THE FUTURE.

## 2024-02-25 DIAGNOSIS — I10 PRIMARY HYPERTENSION: ICD-10-CM

## 2024-02-26 RX ORDER — AMLODIPINE BESYLATE 5 MG/1
5 TABLET ORAL DAILY
Qty: 90 TABLET | Refills: 0 | Status: SHIPPED | OUTPATIENT
Start: 2024-02-26

## 2024-03-08 ENCOUNTER — HOSPITAL ENCOUNTER (OUTPATIENT)
Dept: CARDIOLOGY | Facility: HOSPITAL | Age: 49
Discharge: HOME | End: 2024-03-08
Payer: COMMERCIAL

## 2024-03-08 DIAGNOSIS — I82.512 CHRONIC DEEP VEIN THROMBOSIS (DVT) OF FEMORAL VEIN OF LEFT LOWER EXTREMITY: Primary | ICD-10-CM

## 2024-03-08 DIAGNOSIS — Z86.718 PERSONAL HISTORY OF DVT (DEEP VEIN THROMBOSIS): ICD-10-CM

## 2024-03-08 LAB
BH CV LOW VAS LEFT POPLITEAL SPONT: 1
BH CV LOWER VASCULAR LEFT COMMON FEMORAL AUGMENT: NORMAL
BH CV LOWER VASCULAR LEFT COMMON FEMORAL COMPETENT: NORMAL
BH CV LOWER VASCULAR LEFT COMMON FEMORAL COMPRESS: NORMAL
BH CV LOWER VASCULAR LEFT COMMON FEMORAL PHASIC: NORMAL
BH CV LOWER VASCULAR LEFT COMMON FEMORAL SPONT: NORMAL
BH CV LOWER VASCULAR LEFT DISTAL FEMORAL COMPRESS: NORMAL
BH CV LOWER VASCULAR LEFT GASTRONEMIUS COMPRESS: NORMAL
BH CV LOWER VASCULAR LEFT GREATER SAPH AK COMPRESS: NORMAL
BH CV LOWER VASCULAR LEFT GREATER SAPH BK COMPRESS: NORMAL
BH CV LOWER VASCULAR LEFT LESSER SAPH COMPRESS: NORMAL
BH CV LOWER VASCULAR LEFT MID FEMORAL AUGMENT: NORMAL
BH CV LOWER VASCULAR LEFT MID FEMORAL COMPETENT: NORMAL
BH CV LOWER VASCULAR LEFT MID FEMORAL COMPRESS: NORMAL
BH CV LOWER VASCULAR LEFT MID FEMORAL PHASIC: NORMAL
BH CV LOWER VASCULAR LEFT MID FEMORAL SPONT: NORMAL
BH CV LOWER VASCULAR LEFT PERONEAL COMPRESS: NORMAL
BH CV LOWER VASCULAR LEFT POPLITEAL AUGMENT: NORMAL
BH CV LOWER VASCULAR LEFT POPLITEAL COMPRESS: NORMAL
BH CV LOWER VASCULAR LEFT POPLITEAL PHASIC: NORMAL
BH CV LOWER VASCULAR LEFT POPLITEAL SPONT: NORMAL
BH CV LOWER VASCULAR LEFT POPLITEAL THROMBUS: NORMAL
BH CV LOWER VASCULAR LEFT POSTERIOR TIBIAL COMPRESS: NORMAL
BH CV LOWER VASCULAR LEFT PROXIMAL FEMORAL COMPRESS: NORMAL
BH CV LOWER VASCULAR LEFT SAPHENOFEMORAL JUNCTION COMPRESS: NORMAL
BH CV LOWER VASCULAR RIGHT COMMON FEMORAL AUGMENT: NORMAL
BH CV LOWER VASCULAR RIGHT COMMON FEMORAL COMPETENT: NORMAL
BH CV LOWER VASCULAR RIGHT COMMON FEMORAL COMPRESS: NORMAL
BH CV LOWER VASCULAR RIGHT COMMON FEMORAL PHASIC: NORMAL
BH CV LOWER VASCULAR RIGHT COMMON FEMORAL SPONT: NORMAL

## 2024-03-08 PROCEDURE — 93971 EXTREMITY STUDY: CPT

## 2024-03-18 ENCOUNTER — OFFICE VISIT (OUTPATIENT)
Dept: VASCULAR SURGERY | Facility: HOSPITAL | Age: 49
End: 2024-03-18
Payer: COMMERCIAL

## 2024-03-18 VITALS
RESPIRATION RATE: 18 BRPM | SYSTOLIC BLOOD PRESSURE: 138 MMHG | DIASTOLIC BLOOD PRESSURE: 80 MMHG | OXYGEN SATURATION: 97 % | TEMPERATURE: 98.6 F | HEART RATE: 86 BPM

## 2024-03-18 DIAGNOSIS — I82.502 CHRONIC DEEP VEIN THROMBOSIS (DVT) OF LEFT LOWER EXTREMITY, UNSPECIFIED VEIN: Primary | ICD-10-CM

## 2024-03-18 PROCEDURE — G0463 HOSPITAL OUTPT CLINIC VISIT: HCPCS | Performed by: SURGERY

## 2024-03-18 PROCEDURE — 99202 OFFICE O/P NEW SF 15 MIN: CPT | Performed by: SURGERY

## 2024-03-18 NOTE — PROGRESS NOTES
[facility]   HISTORY AND PHYSICAL    Patient Name: Britany Pyle  : 1975  MRN: [id number]  Primary Care Physician:  Fidelina Mcghee APRN  Date of admission: (Not on file)    Subjective   Subjective     Chief Complaint: Chronic DVT of the left lower extremity    HPI:    Britany Pyle is a 48 y.o. female who in the summer  suffered a right foot fracture.  Her mobility was significantly limited for a period of time.  At some point she had some tightness of the right calf but she did not pay further attention to this.  At a later time she developed significant left leg pain and swelling and was evaluated and found to have an acute DVT of the left lower extremity.  He was started on anticoagulation.  He had follow-up ultrasounds in 2023 and again now in 2024.  The more recent studies demonstrate evidence of chronic DVT.  There is question as to further management.  She indicates that her gynecologist did a workup for hypercoagulability and was reported to be negative.  She has remained on anticoagulation throughout this time except at some point to take anti-inflammatories following surgery.  She wore compression for the first 6 months or so but then has only used it occasionally.    Review of Systems    Non contributory except for the History of Present Illness    Personal History     Past Medical History:   Diagnosis Date    Abnormal uterine and vaginal bleeding, unspecified 2019    Last Assessment & Plan:  One month history of menometrorrhagia Primarily here for prolapse  TVUS/SIS and endometrial thickness. Discussed SIS. The risks, benefits, complications, treatment options/alternatives, and expected outcomes/goals were discussed with the patient including risk of pain, perforation, bleeding, infection, and failure to diagnose. Antibiotics if evidence of hydrosalpinx due to    Anxiety     Breast cancer     Broken bones 2009    Deep vein thrombosis (DVT) associated  with COVID-19 10/07/2022    Hypertension     Localized edema 10/07/2022    Migraine headache     Pneumonia due to COVID-19 virus 02/03/2022    SOB (shortness of breath) 02/23/2022    Uterovaginal prolapse, incomplete 05/09/2016    Last Assessment & Plan:  c = 0 Ba = -3 Bp = -3    Vision disturbance 09/12/2022       Past Surgical History:   Procedure Laterality Date    COLONOSCOPY      LASER ABLATION  04/18/2023    LEG SURGERY      right leg    TUBAL ABDOMINAL LIGATION  04/18/2023       Family History: family history includes Breast cancer in her maternal aunt; Hypertension in her mother; Lung cancer in her maternal grandfather. Otherwise pertinent FHx was reviewed and not pertinent to current issue.    Social History:  reports that she has never smoked. She has never used smokeless tobacco. She reports current alcohol use. She reports that she does not use drugs.    Home Medications:  Current Outpatient Medications on File Prior to Visit   Medication Sig    amLODIPine (NORVASC) 5 MG tablet TAKE 1 TABLET BY MOUTH EVERY DAY    apixaban (Eliquis) 5 MG tablet tablet TAKE 1 TABLET BY MOUTH TWICE DAILY    azelastine (ASTELIN) 0.1 % nasal spray 2 sprays 2 (Two) Times a Day.    busPIRone (BUSPAR) 5 MG tablet TAKE 1 TABLET BY MOUTH THREE TIMES DAILY    cyclobenzaprine (FLEXERIL) 10 MG tablet     ELDERBERRY PO Take 1 each by mouth Daily.    fluticasone (Flonase) 50 MCG/ACT nasal spray 2 sprays into the nostril(s) as directed by provider Daily.    Multiple Vitamins-Minerals (WOMENS MULTIVITAMIN PO) Take  by mouth.    multivitamin (THERAGRAN) tablet tablet Take 2 tablets by mouth Daily.    omeprazole (priLOSEC) 40 MG capsule TAKE 1 CAPSULE BY MOUTH EVERY DAY    Rimegepant Sulfate (NURTEC) 75 MG tablet dispersible tablet Take 1 tablet by mouth Every Other Day As Needed (migraine).    celecoxib (CeleBREX) 200 MG capsule  (Patient not taking: Reported on 3/18/2024)    diazePAM (VALIUM) 5 MG tablet Take 1 tablet by mouth. (Patient  not taking: Reported on 3/18/2024)    fluconazole (DIFLUCAN) 150 MG tablet Take 1 tablet by mouth Every 72 (Seventy-Two) Hours. (Patient not taking: Reported on 3/18/2024)    ondansetron (ZOFRAN) 4 MG tablet  (Patient not taking: Reported on 3/18/2024)    oxyCODONE-acetaminophen (PERCOCET) 5-325 MG per tablet  (Patient not taking: Reported on 3/18/2024)    Semaglutide-Weight Management (Wegovy) 1 MG/0.5ML solution auto-injector Inject 0.5 mL under the skin into the appropriate area as directed 1 (One) Time Per Week. (Patient not taking: Reported on 11/3/2023)     No current facility-administered medications on file prior to visit.          Allergies:  No Known Allergies    Objective   Objective     Vitals:   Temp:  [98.6 °F (37 °C)] 98.6 °F (37 °C)  Heart Rate:  [86] 86  Resp:  [18] 18  BP: (138)/(80) 138/80    Physical Exam    General: Awake, alert, NAD   Eyes:  CRISTINA   Musculoskeletal:  normal motor tone, symmetric   Extremities: No significant leg edema    Neuro: strength 5/5 all extremities     Diagnostic studies:   Venous duplexes from October 2022, March 2023 and March 2024 have been reviewed.  Evidence of acute DVT initially then turning into chronic DVT affecting the left distal femoral and popliteal veins.    Assessment & Plan   Assessment / Plan     Active Hospital Problems:  There are no active hospital problems to display for this patient.      Diagnoses and all orders for this visit:    1. Chronic deep vein thrombosis (DVT) of left lower extremity, unspecified vein (Primary)        Assessment/plan:   I had a long discussion with Britany regarding the events as well as further management.  She has completed at this time over a year of treatment for what appears to have been a provoked DVT.  From my standpoint I would not recommend further anticoagulation at this time.  I advised her that she remains at increased risk for DVTs in the future and if she is going to be in any situation where a higher risk of  DVT may occur she should take extra precautions.  I advised her that it was beneficial that an ultrasound was obtained because this functions as a baseline for her.  Questions answered.  She appears to understand.  Follow-up with us as needed.      Electronically signed by Rico Hardy MD, 03/18/24, 1:56 PM EDT.

## 2024-05-20 NOTE — PROGRESS NOTES
Chief Complaint  Hypertension (Follow-up) and Breast Cancer (Patient has had a double masectomy, reconstruction surgery and a hysterectomy since we last seen her in clinic. )    Subjective          Britany Pyle presents to [facility]  History of Present Illness    Pt requested ivermectin for cancer cells ???     Tamoxifen x5 years   July next appt.   Checking for menopause.   Reconstructive surgery was successful.       Heartburn  She complains of coughing and globus sensation. She reports no abdominal pain, no belching, no chest pain, no choking, no dysphagia, no early satiety, no heartburn, no hoarse voice, no nausea, no sore throat, no stridor, no tooth decay, no water brash or no wheezing. This is a new problem. The current episode started today. Pertinent negatives include no anemia, fatigue, melena, muscle weakness, orthopnea or weight loss. She has tried nothing for the symptoms.       Anxiety  Presents for follow up  visit. Symptoms include nervous/anxious behavior. Patient reports no chest pain, compulsions, confusion, decreased concentration, depressed mood, dizziness, dry mouth, excessive worry, feeling of choking, hyperventilation, impotence, insomnia, irritability, malaise, muscle tension, nausea, obsessions, palpitations, panic, restlessness, shortness of breath or suicidal ideas.       Hypertension   Associated symptoms include anxiety. Pertinent negatives include no blurred vision, chest pain, headaches, malaise/fatigue, neck pain, orthopnea, palpitations, peripheral edema, PND, shortness of breath or sweats. Compliance problems include diet and exercise.  There is no history of angina, kidney disease, CAD/MI, CVA, heart failure, left ventricular hypertrophy, PVD or retinopathy.     Current Outpatient Medications   Medication Instructions    amLODIPine (NORVASC) 5 mg, Oral, Daily    apixaban (ELIQUIS) 5 mg, Oral, 2 Times Daily    azelastine  "(ASTELIN) 0.1 % nasal spray 2 sprays, 2 Times Daily    busPIRone (BUSPAR) 5 MG tablet TAKE 1 TABLET BY MOUTH THREE TIMES DAILY    ELDERBERRY PO 1 each, Oral, Daily    fluticasone (Flonase) 50 MCG/ACT nasal spray 2 sprays, Nasal, Daily    multivitamin (THERAGRAN) tablet tablet 2 each, Oral, Daily    omeprazole (PRILOSEC) 40 mg, Oral, Daily    Rimegepant Sulfate (NURTEC) 75 mg, Oral, Every 48 Hours PRN    tamoxifen (NOLVADEX) 20 mg, Oral, Daily       The following portions of the patient's history were reviewed and updated as appropriate: allergies, current medications, past family history, past medical history, past social history, past surgical history, and problem list.    Objective   Vital Signs:   /84 (BP Location: Left arm, Patient Position: Sitting, Cuff Size: Adult)   Pulse 78   Temp 98 °F (36.7 °C) (Temporal)   Ht 172.7 cm (68\")   Wt 83.5 kg (184 lb)   SpO2 97%   BMI 27.98 kg/m²     BP Readings from Last 3 Encounters:   05/21/24 125/84   03/18/24 138/80   12/14/23 113/70     Wt Readings from Last 3 Encounters:   05/21/24 83.5 kg (184 lb)   12/14/23 82.8 kg (182 lb 8.7 oz)   11/03/23 83.7 kg (184 lb 9.6 oz)           Physical Exam  Constitutional:       Appearance: She is overweight.          Appearance: No acute distress, well-nourished  Head: normocephalic, atraumatic  Eyes: extraocular movements intact, no scleral icterus, no conjunctival injection  Ears, Nose, and Throat: external ears normal, nares patent, moist mucous membranes  Cardiovascular: regular rate and rhythm. no murmurs, rubs, or gallops. no edema  Respiratory: breathing comfortably, symmetric chest rise, clear to auscultation bilaterally. No wheezes, rales, or rhonchi.  Neuro: alert and oriented to time, place, and person. Normal gait  Psych: normal mood and affect     Result Review :   The following data was reviewed by: ROYCE Solis on 05/21/2024:  Common labs          11/10/2023    10:28 1/26/2024    10:04 5/21/2024 "    13:27   Common Labs   Glucose   93    BUN   9    Creatinine   0.88    Sodium   140    Potassium   4.2    Chloride   102    Calcium   9.1    Albumin   4.3    Total Bilirubin   0.5    Alkaline Phosphatase   78    AST (SGOT)   16    ALT (SGPT)   9    WBC 7.79     6.79     6.74    Hemoglobin 13.2     12.6     13.3    Hematocrit 39.5     38.6     40.4    Platelets 314     283     281    Total Cholesterol   172    Triglycerides   147    HDL Cholesterol   43    LDL Cholesterol    103       Details          This result is from an external source.                    Lab Results   Component Value Date    COVID19 NOT DETECTED 04/28/2020    POCPREGUR Negative 01/23/2023       Procedures        Assessment and Plan    Diagnoses and all orders for this visit:    1. Primary hypertension (Primary)  Comments:  well controlled on current regimen  Orders:  -     CBC w AUTO Differential  -     Comprehensive metabolic panel    2. Screening for lipid disorders  -     Lipid panel    3. Screening for thyroid disorder  -     TSH Rfx On Abnormal To Free T4    4. Chronic deep vein thrombosis (DVT) of femoral vein of left lower extremity  -     apixaban (Eliquis) 5 MG tablet tablet; Take 1 tablet by mouth 2 (Two) Times a Day.  Dispense: 360 tablet; Refill: 1    5. Chronic deep vein thrombosis (DVT) of popliteal vein of left lower extremity  Comments:  cont eliquis  Orders:  -     apixaban (Eliquis) 5 MG tablet tablet; Take 1 tablet by mouth 2 (Two) Times a Day.  Dispense: 360 tablet; Refill: 1    6. ABEL (generalized anxiety disorder)  Comments:  well controlled          Medications Discontinued During This Encounter   Medication Reason    celecoxib (CeleBREX) 200 MG capsule     ondansetron (ZOFRAN) 4 MG tablet     oxyCODONE-acetaminophen (PERCOCET) 5-325 MG per tablet     Semaglutide-Weight Management (Wegovy) 1 MG/0.5ML solution auto-injector *Therapy completed    Multiple Vitamins-Minerals (WOMENS MULTIVITAMIN PO) Duplicate order     fluconazole (DIFLUCAN) 150 MG tablet *Therapy completed    diazePAM (VALIUM) 5 MG tablet *Therapy completed    cyclobenzaprine (FLEXERIL) 10 MG tablet *Therapy completed    apixaban (Eliquis) 5 MG tablet tablet Reorder          Follow Up   No follow-ups on file.  Patient was given instructions and counseling regarding her condition or for health maintenance advice. Please see specific information pulled into the AVS if appropriate.       Fidelina Mcghee, ROYCE  05/28/24  11:22 EDT

## 2024-05-21 ENCOUNTER — OFFICE VISIT (OUTPATIENT)
Dept: INTERNAL MEDICINE | Facility: CLINIC | Age: 49
End: 2024-05-21
Payer: COMMERCIAL

## 2024-05-21 VITALS
DIASTOLIC BLOOD PRESSURE: 84 MMHG | BODY MASS INDEX: 27.89 KG/M2 | OXYGEN SATURATION: 97 % | TEMPERATURE: 98 F | WEIGHT: 184 LBS | HEART RATE: 78 BPM | HEIGHT: 68 IN | SYSTOLIC BLOOD PRESSURE: 125 MMHG

## 2024-05-21 DIAGNOSIS — I82.532 CHRONIC DEEP VEIN THROMBOSIS (DVT) OF POPLITEAL VEIN OF LEFT LOWER EXTREMITY: ICD-10-CM

## 2024-05-21 DIAGNOSIS — Z13.220 SCREENING FOR LIPID DISORDERS: ICD-10-CM

## 2024-05-21 DIAGNOSIS — Z13.29 SCREENING FOR THYROID DISORDER: ICD-10-CM

## 2024-05-21 DIAGNOSIS — I10 PRIMARY HYPERTENSION: Primary | Chronic | ICD-10-CM

## 2024-05-21 DIAGNOSIS — F41.1 GAD (GENERALIZED ANXIETY DISORDER): Chronic | ICD-10-CM

## 2024-05-21 DIAGNOSIS — I82.512 CHRONIC DEEP VEIN THROMBOSIS (DVT) OF FEMORAL VEIN OF LEFT LOWER EXTREMITY: ICD-10-CM

## 2024-05-21 LAB
ALBUMIN SERPL-MCNC: 4.3 G/DL (ref 3.5–5.2)
ALBUMIN/GLOB SERPL: 1.4 G/DL
ALP SERPL-CCNC: 78 U/L (ref 39–117)
ALT SERPL W P-5'-P-CCNC: 9 U/L (ref 1–33)
ANION GAP SERPL CALCULATED.3IONS-SCNC: 13.4 MMOL/L (ref 5–15)
AST SERPL-CCNC: 16 U/L (ref 1–32)
BASOPHILS # BLD AUTO: 0.05 10*3/MM3 (ref 0–0.2)
BASOPHILS NFR BLD AUTO: 0.7 % (ref 0–1.5)
BILIRUB SERPL-MCNC: 0.5 MG/DL (ref 0–1.2)
BUN SERPL-MCNC: 9 MG/DL (ref 6–20)
BUN/CREAT SERPL: 10.2 (ref 7–25)
CALCIUM SPEC-SCNC: 9.1 MG/DL (ref 8.6–10.5)
CHLORIDE SERPL-SCNC: 102 MMOL/L (ref 98–107)
CHOLEST SERPL-MCNC: 172 MG/DL (ref 0–200)
CO2 SERPL-SCNC: 24.6 MMOL/L (ref 22–29)
CREAT SERPL-MCNC: 0.88 MG/DL (ref 0.57–1)
DEPRECATED RDW RBC AUTO: 41.4 FL (ref 37–54)
EGFRCR SERPLBLD CKD-EPI 2021: 82.2 ML/MIN/1.73
EOSINOPHIL # BLD AUTO: 0.05 10*3/MM3 (ref 0–0.4)
EOSINOPHIL NFR BLD AUTO: 0.7 % (ref 0.3–6.2)
ERYTHROCYTE [DISTWIDTH] IN BLOOD BY AUTOMATED COUNT: 12.6 % (ref 12.3–15.4)
GLOBULIN UR ELPH-MCNC: 3 GM/DL
GLUCOSE SERPL-MCNC: 93 MG/DL (ref 65–99)
HCT VFR BLD AUTO: 40.4 % (ref 34–46.6)
HDLC SERPL-MCNC: 43 MG/DL (ref 40–60)
HGB BLD-MCNC: 13.3 G/DL (ref 12–15.9)
IMM GRANULOCYTES # BLD AUTO: 0.01 10*3/MM3 (ref 0–0.05)
IMM GRANULOCYTES NFR BLD AUTO: 0.1 % (ref 0–0.5)
LDLC SERPL CALC-MCNC: 103 MG/DL (ref 0–100)
LDLC/HDLC SERPL: 2.32 {RATIO}
LYMPHOCYTES # BLD AUTO: 1.89 10*3/MM3 (ref 0.7–3.1)
LYMPHOCYTES NFR BLD AUTO: 28 % (ref 19.6–45.3)
MCH RBC QN AUTO: 29.7 PG (ref 26.6–33)
MCHC RBC AUTO-ENTMCNC: 32.9 G/DL (ref 31.5–35.7)
MCV RBC AUTO: 90.2 FL (ref 79–97)
MONOCYTES # BLD AUTO: 0.52 10*3/MM3 (ref 0.1–0.9)
MONOCYTES NFR BLD AUTO: 7.7 % (ref 5–12)
NEUTROPHILS NFR BLD AUTO: 4.22 10*3/MM3 (ref 1.7–7)
NEUTROPHILS NFR BLD AUTO: 62.8 % (ref 42.7–76)
NRBC BLD AUTO-RTO: 0 /100 WBC (ref 0–0.2)
PLATELET # BLD AUTO: 281 10*3/MM3 (ref 140–450)
PMV BLD AUTO: 10.9 FL (ref 6–12)
POTASSIUM SERPL-SCNC: 4.2 MMOL/L (ref 3.5–5.2)
PROT SERPL-MCNC: 7.3 G/DL (ref 6–8.5)
RBC # BLD AUTO: 4.48 10*6/MM3 (ref 3.77–5.28)
SODIUM SERPL-SCNC: 140 MMOL/L (ref 136–145)
TRIGL SERPL-MCNC: 147 MG/DL (ref 0–150)
TSH SERPL DL<=0.05 MIU/L-ACNC: 1.7 UIU/ML (ref 0.27–4.2)
VLDLC SERPL-MCNC: 26 MG/DL (ref 5–40)
WBC NRBC COR # BLD AUTO: 6.74 10*3/MM3 (ref 3.4–10.8)

## 2024-05-21 PROCEDURE — 99214 OFFICE O/P EST MOD 30 MIN: CPT | Performed by: NURSE PRACTITIONER

## 2024-05-21 PROCEDURE — 80061 LIPID PANEL: CPT | Performed by: NURSE PRACTITIONER

## 2024-05-21 PROCEDURE — 80050 GENERAL HEALTH PANEL: CPT | Performed by: NURSE PRACTITIONER

## 2024-05-21 RX ORDER — TAMOXIFEN CITRATE 20 MG/1
20 TABLET ORAL DAILY
COMMUNITY
Start: 2024-03-20

## 2024-05-25 DIAGNOSIS — I10 PRIMARY HYPERTENSION: ICD-10-CM

## 2024-05-28 RX ORDER — AMLODIPINE BESYLATE 5 MG/1
5 TABLET ORAL DAILY
Qty: 90 TABLET | Refills: 0 | Status: SHIPPED | OUTPATIENT
Start: 2024-05-28

## 2024-07-12 ENCOUNTER — TELEPHONE (OUTPATIENT)
Dept: INTERNAL MEDICINE | Facility: CLINIC | Age: 49
End: 2024-07-12
Payer: MEDICAID

## 2024-07-12 NOTE — TELEPHONE ENCOUNTER
Caller: Britany Pyle    Relationship to patient: Self    Best call back number: [phone]     Chief complaint: NEW MEDICATION     Type of visit: OFFICE    Requested date: ASAP      Additional notes:PATIENT REQUESTING APPOINTMENT TO DISCUSS HER ANTIDEPRESSANT MEDICATION.

## 2024-07-18 ENCOUNTER — OFFICE VISIT (OUTPATIENT)
Dept: INTERNAL MEDICINE | Facility: CLINIC | Age: 49
End: 2024-07-18
Payer: COMMERCIAL

## 2024-07-18 VITALS
BODY MASS INDEX: 26.98 KG/M2 | TEMPERATURE: 97.8 F | HEART RATE: 80 BPM | OXYGEN SATURATION: 96 % | SYSTOLIC BLOOD PRESSURE: 125 MMHG | WEIGHT: 178 LBS | DIASTOLIC BLOOD PRESSURE: 90 MMHG | HEIGHT: 68 IN

## 2024-07-18 DIAGNOSIS — F33.0 MILD EPISODE OF RECURRENT MAJOR DEPRESSIVE DISORDER: Primary | ICD-10-CM

## 2024-07-18 DIAGNOSIS — F41.1 GAD (GENERALIZED ANXIETY DISORDER): ICD-10-CM

## 2024-07-18 PROCEDURE — 3080F DIAST BP >= 90 MM HG: CPT | Performed by: NURSE PRACTITIONER

## 2024-07-18 PROCEDURE — 1126F AMNT PAIN NOTED NONE PRSNT: CPT | Performed by: NURSE PRACTITIONER

## 2024-07-18 PROCEDURE — 99214 OFFICE O/P EST MOD 30 MIN: CPT | Performed by: NURSE PRACTITIONER

## 2024-07-18 PROCEDURE — 1159F MED LIST DOCD IN RCRD: CPT | Performed by: NURSE PRACTITIONER

## 2024-07-18 PROCEDURE — 1160F RVW MEDS BY RX/DR IN RCRD: CPT | Performed by: NURSE PRACTITIONER

## 2024-07-18 PROCEDURE — 3074F SYST BP LT 130 MM HG: CPT | Performed by: NURSE PRACTITIONER

## 2024-07-18 NOTE — PROGRESS NOTES
Chief Complaint  Depression (Wanting to be put on medication.)    Subjective          Britany Pyle presents to [facility]  Depression  Presents for initial visit. Symptoms include decreased concentration, depressed mood, excessive worry, feelings of hopelessness, feelings of worthlessness, insomnia, irritability, nervousness/anxiety, malaise/fatigue and difficulty controlling mood. Patient is not experiencing: compulsions, confusion, dry mouth, hypersomnia, hyperventilation, impotence, muscle tension, obsessions, palpitations, panic, psychomotor agitation, psychomotor retardation, shortness of breath, suicidal ideas, suicidal planning, thoughts of death, weight gain, weight loss, chest pain, feeling of choking, dizziness, nausea, difficulty staying asleep and difficulty falling asleep.  Her past medical history is significant for depression.  Risk factors include major life event. Risk factors include major life event.         Current Outpatient Medications   Medication Instructions    amLODIPine (NORVASC) 5 mg, Oral, Daily    apixaban (ELIQUIS) 5 mg, Oral, 2 Times Daily    azelastine (ASTELIN) 0.1 % nasal spray 2 sprays, 2 Times Daily    busPIRone (BUSPAR) 5 MG tablet TAKE 1 TABLET BY MOUTH THREE TIMES DAILY    ELDERBERRY PO 1 each, Oral, Daily    fluticasone (Flonase) 50 MCG/ACT nasal spray 2 sprays, Nasal, Daily    multivitamin (THERAGRAN) tablet tablet 1 tablet, Oral, Daily    omeprazole (PRILOSEC) 40 mg, Oral, Daily    Rimegepant Sulfate (NURTEC) 75 mg, Oral, Every 48 Hours PRN    sertraline (ZOLOFT) 50 mg, Oral, Daily, Take 1/2 tab PO QHS x 5 days then increase to 1 tab PO QHS    tamoxifen (NOLVADEX) 20 mg, Oral, Daily       The following portions of the patient's history were reviewed and updated as appropriate: allergies, current medications, past family history, past medical history, past social history, past surgical history, and problem  "list.    Objective   Vital Signs:   /90 (BP Location: Right arm, Patient Position: Sitting, Cuff Size: Adult)   Pulse 80   Temp 97.8 °F (36.6 °C) (Temporal)   Ht 172.7 cm (68\")   Wt 80.7 kg (178 lb)   SpO2 96%   BMI 27.06 kg/m²     BP Readings from Last 3 Encounters:   07/18/24 125/90   05/21/24 125/84   03/18/24 138/80     Wt Readings from Last 3 Encounters:   07/18/24 80.7 kg (178 lb)   05/21/24 83.5 kg (184 lb)   12/14/23 82.8 kg (182 lb 8.7 oz)           Physical Exam     Appearance: No acute distress, well-nourished  Head: normocephalic, atraumatic  Eyes: extraocular movements intact, no scleral icterus, no conjunctival injection  Ears, Nose, and Throat: external ears normal, nares patent, moist mucous membranes  Cardiovascular: regular rate and rhythm. no murmurs, rubs, or gallops. no edema  Respiratory: breathing comfortably, symmetric chest rise, clear to auscultation bilaterally. No wheezes, rales, or rhonchi.  Neuro: alert and oriented to time, place, and person. Normal gait  Psych: normal mood and affect     Result Review :   The following data was reviewed by: ROYCE Solis on 07/18/2024:  Common labs          11/10/2023    10:28 1/26/2024    10:04 5/21/2024    13:27   Common Labs   Glucose   93    BUN   9    Creatinine   0.88    Sodium   140    Potassium   4.2    Chloride   102    Calcium   9.1    Albumin   4.3    Total Bilirubin   0.5    Alkaline Phosphatase   78    AST (SGOT)   16    ALT (SGPT)   9    WBC 7.79     6.79     6.74    Hemoglobin 13.2     12.6     13.3    Hematocrit 39.5     38.6     40.4    Platelets 314     283     281    Total Cholesterol   172    Triglycerides   147    HDL Cholesterol   43    LDL Cholesterol    103       Details          This result is from an external source.                    Lab Results   Component Value Date    COVID19 NOT DETECTED 04/28/2020    POCPREGUR Negative 01/23/2023     PHQ-9 Depression Screening  Little interest or pleasure in " doing things? 2-->more than half the days   Feeling down, depressed, or hopeless? 1-->several days   Trouble falling or staying asleep, or sleeping too much? 2-->more than half the days   Feeling tired or having little energy? 3-->nearly every day   Poor appetite or overeating? 2-->more than half the days   Feeling bad about yourself - or that you are a failure or have let yourself or your family down? 2-->more than half the days   Trouble concentrating on things, such as reading the newspaper or watching television? 2-->more than half the days   Moving or speaking so slowly that other people could have noticed? Or the opposite - being so fidgety or restless that you have been moving around a lot more than usual? 0-->not at all   Thoughts that you would be better off dead, or of hurting yourself in some way? 0-->not at all   PHQ-9 Total Score 14   If you checked off any problems, how difficult have these problems made it for you to do your work, take care of things at home, or get along with other people? very difficult      ABEL-7  Feeling nervous, anxious or on edge: Not at all  Not being able to stop or control worrying: Several days  Worrying too much about different things: Several days  Trouble Relaxing: Several days  Being so restless that it is hard to sit still: Not at all  Feeling afraid as if something awful might happen: Not at all  Becoming easily annoyed or irritable: Several days  ABEL 7 Total Score: 4  If you checked any problems, how difficult have these problems made it for you to do your work, take care of things at home, or get along with other people: Somewhat difficult            Assessment and Plan    Diagnoses and all orders for this visit:    1. Mild episode of recurrent major depressive disorder (Primary)  -     sertraline (Zoloft) 50 MG tablet; Take 1 tablet by mouth Daily. Take 1/2 tab PO QHS x 5 days then increase to 1 tab PO QHS  Dispense: 30 tablet; Refill: 1    2. ABEL (generalized  anxiety disorder)  -     sertraline (Zoloft) 50 MG tablet; Take 1 tablet by mouth Daily. Take 1/2 tab PO QHS x 5 days then increase to 1 tab PO QHS  Dispense: 30 tablet; Refill: 1      Initiate Zoloft today.  Blackbox warning discussed.  Continue counseling    There are no discontinued medications.       Follow Up   Return in about 4 weeks (around 8/15/2024) for Anxiety, Depression.  Patient was given instructions and counseling regarding her condition or for health maintenance advice. Please see specific information pulled into the AVS if appropriate.       Fidelina Mcghee, ROYCE  07/25/24  10:23 EDT

## 2024-07-24 LAB
CYTO UR: NORMAL
LAB AP CASE REPORT: NORMAL
LAB AP CLINICAL INFORMATION: NORMAL
LAB AP SPECIAL STAINS: NORMAL
LAB AP SYNOPTIC CHECKLIST: NORMAL
PATH REPORT.ADDENDUM SPEC: NORMAL
PATH REPORT.FINAL DX SPEC: NORMAL
PATH REPORT.GROSS SPEC: NORMAL

## 2024-08-15 ENCOUNTER — OFFICE VISIT (OUTPATIENT)
Dept: INTERNAL MEDICINE | Facility: CLINIC | Age: 49
End: 2024-08-15
Payer: COMMERCIAL

## 2024-08-15 VITALS
TEMPERATURE: 97.2 F | SYSTOLIC BLOOD PRESSURE: 135 MMHG | DIASTOLIC BLOOD PRESSURE: 92 MMHG | WEIGHT: 175 LBS | BODY MASS INDEX: 26.52 KG/M2 | HEIGHT: 68 IN | HEART RATE: 73 BPM | OXYGEN SATURATION: 97 %

## 2024-08-15 DIAGNOSIS — F33.0 MILD EPISODE OF RECURRENT MAJOR DEPRESSIVE DISORDER: Chronic | ICD-10-CM

## 2024-08-15 DIAGNOSIS — F41.1 GAD (GENERALIZED ANXIETY DISORDER): Chronic | ICD-10-CM

## 2024-08-15 DIAGNOSIS — I10 PRIMARY HYPERTENSION: Chronic | ICD-10-CM

## 2024-08-15 PROCEDURE — 99214 OFFICE O/P EST MOD 30 MIN: CPT | Performed by: NURSE PRACTITIONER

## 2024-08-15 PROCEDURE — 3075F SYST BP GE 130 - 139MM HG: CPT | Performed by: NURSE PRACTITIONER

## 2024-08-15 PROCEDURE — 1160F RVW MEDS BY RX/DR IN RCRD: CPT | Performed by: NURSE PRACTITIONER

## 2024-08-15 PROCEDURE — 1126F AMNT PAIN NOTED NONE PRSNT: CPT | Performed by: NURSE PRACTITIONER

## 2024-08-15 PROCEDURE — 1159F MED LIST DOCD IN RCRD: CPT | Performed by: NURSE PRACTITIONER

## 2024-08-15 PROCEDURE — 3080F DIAST BP >= 90 MM HG: CPT | Performed by: NURSE PRACTITIONER

## 2024-08-15 RX ORDER — BUSPIRONE HYDROCHLORIDE 5 MG/1
5 TABLET ORAL 3 TIMES DAILY
Qty: 90 TABLET | Refills: 1 | Status: SHIPPED | OUTPATIENT
Start: 2024-08-15

## 2024-08-15 RX ORDER — AMLODIPINE BESYLATE 5 MG/1
5 TABLET ORAL DAILY
Qty: 90 TABLET | Refills: 1 | Status: SHIPPED | OUTPATIENT
Start: 2024-08-15

## 2024-08-15 NOTE — PROGRESS NOTES
Chief Complaint  Depression    Olga Pyle presents to [facility]  History of Present Illness    Depression  Presents for follow up visit. Symptoms include decreased concentration, depressed mood, excessive worry, feelings of hopelessness, feelings of worthlessness, insomnia, irritability, nervousness/anxiety, malaise/fatigue and difficulty controlling mood. Patient is not experiencing: compulsions, confusion, dry mouth, hypersomnia, hyperventilation, impotence, muscle tension, obsessions, palpitations, panic, psychomotor agitation, psychomotor retardation, shortness of breath, suicidal ideas, suicidal planning, thoughts of death, weight gain, weight loss, chest pain, feeling of choking, dizziness, nausea, difficulty staying asleep and difficulty falling asleep.  Her past medical history is significant for depression.  Risk factors include major life event. Risk factors include major life event.     Doing better since starting the zoloft       Hypertension   Associated symptoms include anxiety. Pertinent negatives include no blurred vision, chest pain, headaches, malaise/fatigue, neck pain, orthopnea, palpitations, peripheral edema, PND, shortness of breath or sweats. Compliance problems include diet and exercise.  There is no history of angina, kidney disease, CAD/MI, CVA, heart failure, left ventricular hypertrophy, PVD or retinopathy.       Current Outpatient Medications   Medication Instructions    amLODIPine (NORVASC) 5 mg, Oral, Daily    apixaban (ELIQUIS) 5 mg, Oral, 2 Times Daily    azelastine (ASTELIN) 0.1 % nasal spray 2 sprays, 2 Times Daily    busPIRone (BUSPAR) 5 mg, Oral, 3 Times Daily    ELDERBERRY PO 1 each, Oral, Daily    fluticasone (Flonase) 50 MCG/ACT nasal spray 2 sprays, Nasal, Daily    multivitamin (THERAGRAN) tablet tablet 1 tablet, Oral, Daily    omeprazole (PRILOSEC) 40 mg, Oral, Daily    Rimegepant Sulfate (NURTEC)  "75 mg, Oral, Every 48 Hours PRN    sertraline (ZOLOFT) 50 mg, Oral, Daily    tamoxifen (NOLVADEX) 20 mg, Oral, Daily       The following portions of the patient's history were reviewed and updated as appropriate: allergies, current medications, past family history, past medical history, past social history, past surgical history, and problem list.    Objective   Vital Signs:   /92   Pulse 73   Temp 97.2 °F (36.2 °C)   Ht 172.7 cm (68\")   Wt 79.4 kg (175 lb)   SpO2 97%   BMI 26.61 kg/m²     BP Readings from Last 3 Encounters:   08/15/24 135/92   07/18/24 125/90   05/21/24 125/84     Wt Readings from Last 3 Encounters:   08/15/24 79.4 kg (175 lb)   07/18/24 80.7 kg (178 lb)   05/21/24 83.5 kg (184 lb)           Physical Exam     Appearance: No acute distress, well-nourished  Head: normocephalic, atraumatic  Eyes: extraocular movements intact, no scleral icterus, no conjunctival injection  Ears, Nose, and Throat: external ears normal, nares patent, moist mucous membranes  Cardiovascular: regular rate and rhythm. no murmurs, rubs, or gallops. no edema  Respiratory: breathing comfortably, symmetric chest rise, clear to auscultation bilaterally. No wheezes, rales, or rhonchi.  Neuro: alert and oriented to time, place, and person. Normal gait  Psych: normal mood and affect     Result Review :   The following data was reviewed by: ROYCE Solis on 08/15/2024:  Common labs          11/10/2023    10:28 1/26/2024    10:04 5/21/2024    13:27   Common Labs   Glucose   93    BUN   9    Creatinine   0.88    Sodium   140    Potassium   4.2    Chloride   102    Calcium   9.1    Albumin   4.3    Total Bilirubin   0.5    Alkaline Phosphatase   78    AST (SGOT)   16    ALT (SGPT)   9    WBC 7.79     6.79     6.74    Hemoglobin 13.2     12.6     13.3    Hematocrit 39.5     38.6     40.4    Platelets 314     283     281    Total Cholesterol   172    Triglycerides   147    HDL Cholesterol   43    LDL Cholesterol    " 103       Details          This result is from an external source.                    Lab Results   Component Value Date    COVID19 NOT DETECTED 04/28/2020    POCPREGUR Negative 01/23/2023            Assessment and Plan    Diagnoses and all orders for this visit:    1. Mild episode of recurrent major depressive disorder  Comments:  well controlled continue current regimen  Orders:  -     sertraline (Zoloft) 50 MG tablet; Take 1 tablet by mouth Daily.  Dispense: 90 tablet; Refill: 1    2. ABEL (generalized anxiety disorder)  Comments:  well controlled continue current regimen  Orders:  -     sertraline (Zoloft) 50 MG tablet; Take 1 tablet by mouth Daily.  Dispense: 90 tablet; Refill: 1  -     busPIRone (BUSPAR) 5 MG tablet; Take 1 tablet by mouth 3 (Three) Times a Day.  Dispense: 90 tablet; Refill: 1    3. Primary hypertension  Comments:  well controlled continue current regimen  Orders:  -     amLODIPine (NORVASC) 5 MG tablet; Take 1 tablet by mouth Daily.  Dispense: 90 tablet; Refill: 1          Medications Discontinued During This Encounter   Medication Reason    sertraline (Zoloft) 50 MG tablet Reorder    busPIRone (BUSPAR) 5 MG tablet Reorder    amLODIPine (NORVASC) 5 MG tablet Reorder          Follow Up   Return in about 3 months (around 11/15/2024) for Anxiety, Depression.  Patient was given instructions and counseling regarding her condition or for health maintenance advice. Please see specific information pulled into the AVS if appropriate.       Fidelina Mcghee, APRN  08/21/24  12:32 EDT

## 2024-10-31 ENCOUNTER — PRIOR AUTHORIZATION (OUTPATIENT)
Dept: INTERNAL MEDICINE | Facility: CLINIC | Age: 49
End: 2024-10-31
Payer: MEDICAID

## 2024-10-31 NOTE — TELEPHONE ENCOUNTER
PA RENEWAL required for the following medication:     Rimegepant Sulfate (NURTEC) 75 MG tablet dispersible tablet (11/03/2023)     Key: [id number]    Indexed via Onbase

## 2024-11-11 NOTE — PROGRESS NOTES
Chief Complaint  Anxiety ; HTN     Subjective          Britany Pyle presents to [facility]  History of Present Illness    History of Present Illness    Depression  Presents for follow up visit. Symptoms include decreased concentration, depressed mood, excessive worry, feelings of hopelessness, feelings of worthlessness, insomnia, irritability, nervousness/anxiety, malaise/fatigue and difficulty controlling mood. Patient is not experiencing: compulsions, confusion, dry mouth, hypersomnia, hyperventilation, impotence, muscle tension, obsessions, palpitations, panic, psychomotor agitation, psychomotor retardation, shortness of breath, suicidal ideas, suicidal planning, thoughts of death, weight gain, weight loss, chest pain, feeling of choking, dizziness, nausea, difficulty staying asleep and difficulty falling asleep.  Her past medical history is significant for depression.  Risk factors include major life event. Risk factors include major life event.     Doing better       Hypertension   Associated symptoms include anxiety. Pertinent negatives include no blurred vision, chest pain, headaches, malaise/fatigue, neck pain, orthopnea, palpitations, peripheral edema, PND, shortness of breath or sweats. Compliance problems include diet and exercise.  There is no history of angina, kidney disease, CAD/MI, CVA, heart failure, left ventricular hypertrophy, PVD or retinopathy.       Current Outpatient Medications   Medication Instructions    amLODIPine (NORVASC) 5 mg, Oral, Daily    apixaban (ELIQUIS) 5 mg, Oral, 2 Times Daily    azelastine (ASTELIN) 0.1 % nasal spray 2 sprays, 2 Times Daily    busPIRone (BUSPAR) 5 mg, Oral, 3 Times Daily    ELDERBERRY PO 1 each, Daily    fluticasone (Flonase) 50 MCG/ACT nasal spray 2 sprays, Nasal, Daily    multivitamin (THERAGRAN) tablet tablet 1 tablet, Daily    omeprazole (PRILOSEC) 40 mg, Oral, Daily    Rimegepant Sulfate (NURTEC) 75  "mg, Oral, Every 48 Hours PRN    tamoxifen (NOLVADEX) 20 mg, Daily       The following portions of the patient's history were reviewed and updated as appropriate: allergies, current medications, past family history, past medical history, past social history, past surgical history, and problem list.    Objective   Vital Signs:   /96   Pulse 88   Temp 98.3 °F (36.8 °C) (Temporal)   Ht 172.7 cm (68\")   Wt 81.9 kg (180 lb 9.6 oz)   SpO2 97%   BMI 27.46 kg/m²     BP Readings from Last 3 Encounters:   11/15/24 137/96   08/15/24 135/92   07/18/24 125/90     Wt Readings from Last 3 Encounters:   11/15/24 81.9 kg (180 lb 9.6 oz)   08/15/24 79.4 kg (175 lb)   07/18/24 80.7 kg (178 lb)     BMI is >= 25 and <30. (Overweight) The following options were offered after discussion;: weight loss educational material (shared in after visit summary), exercise counseling/recommendations, and nutrition counseling/recommendations     Physical Exam     Appearance: No acute distress, well-nourished  Head: normocephalic, atraumatic  Eyes: extraocular movements intact, no scleral icterus, no conjunctival injection  Ears, Nose, and Throat: external ears normal, nares patent, moist mucous membranes  Cardiovascular: regular rate and rhythm. no murmurs, rubs, or gallops. no edema  Respiratory: breathing comfortably, symmetric chest rise, clear to auscultation bilaterally. No wheezes, rales, or rhonchi.  Neuro: alert and oriented to time, place, and person. Normal gait  Psych: normal mood and affect     Physical Exam        Result Review :   The following data was reviewed by: ROYCE Solis on 11/15/2024:  Common labs          1/26/2024    10:04 5/21/2024    13:27 11/15/2024    12:08   Common Labs   Glucose  93  79    BUN  9  9    Creatinine  0.88  0.67    Sodium  140  139    Potassium  4.2  4.0    Chloride  102  104    Calcium  9.1  9.3    Albumin  4.3  4.1    Total Bilirubin  0.5  0.4    Alkaline Phosphatase  78  66    AST " (SGOT)  16  16    ALT (SGPT)  9  13    WBC 6.79     6.74  7.04    Hemoglobin 12.6     13.3  13.6    Hematocrit 38.6     40.4  39.4    Platelets 283     281  277    Total Cholesterol  172  177    Triglycerides  147  238    HDL Cholesterol  43  39    LDL Cholesterol   103  97       Details          This result is from an external source.               Results           Lab Results   Component Value Date    COVID19 NOT DETECTED 04/28/2020    POCPREGUR Negative 01/23/2023            Assessment and Plan    Diagnoses and all orders for this visit:    1. Mild episode of recurrent major depressive disorder (Primary)    2. ABEL (generalized anxiety disorder)    3. Primary hypertension  Comments:  mildly eleavted; will cont to monitor--- cont current regimen  Orders:  -     CBC w AUTO Differential  -     Comprehensive metabolic panel  -     Lipid panel  -     amLODIPine (NORVASC) 5 MG tablet; Take 1 tablet by mouth Daily.    4. Screening for lipid disorders  -     Lipid panel    5. Primary hypertension  Comments:  well controlled continue current regimen  Orders:  -     CBC w AUTO Differential  -     Comprehensive metabolic panel  -     Lipid panel  -     amLODIPine (NORVASC) 5 MG tablet; Take 1 tablet by mouth Daily.      Reaching out to hemonc MD about her blood pressure medication   Assessment & Plan      Medications Discontinued During This Encounter   Medication Reason    sertraline (Zoloft) 50 MG tablet     amLODIPine (NORVASC) 5 MG tablet Reorder          Follow Up   Return in about 3 months (around 2/15/2025) for Hypertension.  Patient was given instructions and counseling regarding her condition or for health maintenance advice. Please see specific information pulled into the AVS if appropriate.       ROYCE Solis  11/21/24  15:55 EST      Patient or patient representative verbalized consent for the use of Ambient Listening during the visit with  ROYCE Solsi for chart documentation.  11/21/2024  15:56 EST

## 2024-11-15 ENCOUNTER — OFFICE VISIT (OUTPATIENT)
Dept: INTERNAL MEDICINE | Facility: CLINIC | Age: 49
End: 2024-11-15
Payer: COMMERCIAL

## 2024-11-15 VITALS
HEART RATE: 88 BPM | SYSTOLIC BLOOD PRESSURE: 137 MMHG | BODY MASS INDEX: 27.37 KG/M2 | DIASTOLIC BLOOD PRESSURE: 96 MMHG | OXYGEN SATURATION: 97 % | HEIGHT: 68 IN | TEMPERATURE: 98.3 F | WEIGHT: 180.6 LBS

## 2024-11-15 DIAGNOSIS — F41.1 GAD (GENERALIZED ANXIETY DISORDER): ICD-10-CM

## 2024-11-15 DIAGNOSIS — F33.0 MILD EPISODE OF RECURRENT MAJOR DEPRESSIVE DISORDER: Primary | ICD-10-CM

## 2024-11-15 DIAGNOSIS — I10 PRIMARY HYPERTENSION: Chronic | ICD-10-CM

## 2024-11-15 DIAGNOSIS — Z13.220 SCREENING FOR LIPID DISORDERS: ICD-10-CM

## 2024-11-15 LAB
ALBUMIN SERPL-MCNC: 4.1 G/DL (ref 3.5–5.2)
ALBUMIN/GLOB SERPL: 1.4 G/DL
ALP SERPL-CCNC: 66 U/L (ref 39–117)
ALT SERPL W P-5'-P-CCNC: 13 U/L (ref 1–33)
ANION GAP SERPL CALCULATED.3IONS-SCNC: 12 MMOL/L (ref 5–15)
AST SERPL-CCNC: 16 U/L (ref 1–32)
BASOPHILS # BLD AUTO: 0.06 10*3/MM3 (ref 0–0.2)
BASOPHILS NFR BLD AUTO: 0.9 % (ref 0–1.5)
BILIRUB SERPL-MCNC: 0.4 MG/DL (ref 0–1.2)
BUN SERPL-MCNC: 9 MG/DL (ref 6–20)
BUN/CREAT SERPL: 13.4 (ref 7–25)
CALCIUM SPEC-SCNC: 9.3 MG/DL (ref 8.6–10.5)
CHLORIDE SERPL-SCNC: 104 MMOL/L (ref 98–107)
CHOLEST SERPL-MCNC: 177 MG/DL (ref 0–200)
CO2 SERPL-SCNC: 23 MMOL/L (ref 22–29)
CREAT SERPL-MCNC: 0.67 MG/DL (ref 0.57–1)
DEPRECATED RDW RBC AUTO: 38.5 FL (ref 37–54)
EGFRCR SERPLBLD CKD-EPI 2021: 109.3 ML/MIN/1.73
EOSINOPHIL # BLD AUTO: 0.04 10*3/MM3 (ref 0–0.4)
EOSINOPHIL NFR BLD AUTO: 0.6 % (ref 0.3–6.2)
ERYTHROCYTE [DISTWIDTH] IN BLOOD BY AUTOMATED COUNT: 11.6 % (ref 12.3–15.4)
GLOBULIN UR ELPH-MCNC: 2.9 GM/DL
GLUCOSE SERPL-MCNC: 79 MG/DL (ref 65–99)
HCT VFR BLD AUTO: 39.4 % (ref 34–46.6)
HDLC SERPL-MCNC: 39 MG/DL (ref 40–60)
HGB BLD-MCNC: 13.6 G/DL (ref 12–15.9)
IMM GRANULOCYTES # BLD AUTO: 0.01 10*3/MM3 (ref 0–0.05)
IMM GRANULOCYTES NFR BLD AUTO: 0.1 % (ref 0–0.5)
LDLC SERPL CALC-MCNC: 97 MG/DL (ref 0–100)
LDLC/HDLC SERPL: 2.32 {RATIO}
LYMPHOCYTES # BLD AUTO: 2.6 10*3/MM3 (ref 0.7–3.1)
LYMPHOCYTES NFR BLD AUTO: 36.9 % (ref 19.6–45.3)
MCH RBC QN AUTO: 32 PG (ref 26.6–33)
MCHC RBC AUTO-ENTMCNC: 34.5 G/DL (ref 31.5–35.7)
MCV RBC AUTO: 92.7 FL (ref 79–97)
MONOCYTES # BLD AUTO: 0.51 10*3/MM3 (ref 0.1–0.9)
MONOCYTES NFR BLD AUTO: 7.2 % (ref 5–12)
NEUTROPHILS NFR BLD AUTO: 3.82 10*3/MM3 (ref 1.7–7)
NEUTROPHILS NFR BLD AUTO: 54.3 % (ref 42.7–76)
NRBC BLD AUTO-RTO: 0 /100 WBC (ref 0–0.2)
PLATELET # BLD AUTO: 277 10*3/MM3 (ref 140–450)
PMV BLD AUTO: 10.6 FL (ref 6–12)
POTASSIUM SERPL-SCNC: 4 MMOL/L (ref 3.5–5.2)
PROT SERPL-MCNC: 7 G/DL (ref 6–8.5)
RBC # BLD AUTO: 4.25 10*6/MM3 (ref 3.77–5.28)
SODIUM SERPL-SCNC: 139 MMOL/L (ref 136–145)
TRIGL SERPL-MCNC: 238 MG/DL (ref 0–150)
VLDLC SERPL-MCNC: 41 MG/DL (ref 5–40)
WBC NRBC COR # BLD AUTO: 7.04 10*3/MM3 (ref 3.4–10.8)

## 2024-11-15 PROCEDURE — 3075F SYST BP GE 130 - 139MM HG: CPT | Performed by: NURSE PRACTITIONER

## 2024-11-15 PROCEDURE — 1126F AMNT PAIN NOTED NONE PRSNT: CPT | Performed by: NURSE PRACTITIONER

## 2024-11-15 PROCEDURE — 1160F RVW MEDS BY RX/DR IN RCRD: CPT | Performed by: NURSE PRACTITIONER

## 2024-11-15 PROCEDURE — 80061 LIPID PANEL: CPT | Performed by: NURSE PRACTITIONER

## 2024-11-15 PROCEDURE — 99214 OFFICE O/P EST MOD 30 MIN: CPT | Performed by: NURSE PRACTITIONER

## 2024-11-15 PROCEDURE — 85025 COMPLETE CBC W/AUTO DIFF WBC: CPT | Performed by: NURSE PRACTITIONER

## 2024-11-15 PROCEDURE — 1159F MED LIST DOCD IN RCRD: CPT | Performed by: NURSE PRACTITIONER

## 2024-11-15 PROCEDURE — 3080F DIAST BP >= 90 MM HG: CPT | Performed by: NURSE PRACTITIONER

## 2024-11-15 PROCEDURE — 80053 COMPREHEN METABOLIC PANEL: CPT | Performed by: NURSE PRACTITIONER

## 2024-11-21 ENCOUNTER — TELEPHONE (OUTPATIENT)
Dept: INTERNAL MEDICINE | Facility: CLINIC | Age: 49
End: 2024-11-21
Payer: COMMERCIAL

## 2024-11-21 RX ORDER — AMLODIPINE BESYLATE 5 MG/1
5 TABLET ORAL DAILY
Start: 2024-11-21

## 2024-11-21 NOTE — TELEPHONE ENCOUNTER
Spoke with staff at [facility].  They are going to send the physician a message and then get back with me.

## 2024-11-21 NOTE — TELEPHONE ENCOUNTER
Left a message with [facility] for the MA of Arsh Caceres.     Per ROYCE Solis when patient seen in our clinic on 11/15/2024 she told Fidelina that the Hematology Doctor (Arsh Caceres) gave her a paper saying her amlodipine reacted with her tamoxifen however per Fidelina the Hematology Doctor knew Britany was on it and didn't tell her to talk to me about coming off of it.     Fidelina needs to know if Arsh wants Britany's Amlodipine changed. If so, does he has something he prefers?

## 2024-11-22 NOTE — TELEPHONE ENCOUNTER
Spoke with patient. Made aware we have spoke with [facility] and are waiting to hear back from them.

## 2024-12-03 ENCOUNTER — LAB (OUTPATIENT)
Facility: HOSPITAL | Age: 49
End: 2024-12-03
Payer: COMMERCIAL

## 2024-12-03 DIAGNOSIS — R30.0 DYSURIA: Primary | ICD-10-CM

## 2024-12-03 DIAGNOSIS — R30.0 DYSURIA: ICD-10-CM

## 2024-12-03 LAB
BACTERIA UR QL AUTO: ABNORMAL /HPF
BILIRUB UR QL STRIP: NEGATIVE
CLARITY UR: ABNORMAL
COD CRY URNS QL: PRESENT /HPF
COLOR UR: YELLOW
GLUCOSE UR STRIP-MCNC: ABNORMAL MG/DL
HGB UR QL STRIP.AUTO: NEGATIVE
HYALINE CASTS UR QL AUTO: ABNORMAL /LPF
KETONES UR QL STRIP: ABNORMAL
LEUKOCYTE ESTERASE UR QL STRIP.AUTO: ABNORMAL
NITRITE UR QL STRIP: NEGATIVE
PH UR STRIP.AUTO: 5.5 [PH] (ref 5–8)
PROT UR QL STRIP: ABNORMAL
RBC # UR STRIP: ABNORMAL /HPF
REF LAB TEST METHOD: ABNORMAL
SP GR UR STRIP: 1.03 (ref 1–1.03)
SQUAMOUS #/AREA URNS HPF: ABNORMAL /HPF
UROBILINOGEN UR QL STRIP: ABNORMAL
WBC # UR STRIP: ABNORMAL /HPF

## 2024-12-03 PROCEDURE — 87077 CULTURE AEROBIC IDENTIFY: CPT

## 2024-12-03 PROCEDURE — 87086 URINE CULTURE/COLONY COUNT: CPT

## 2024-12-03 PROCEDURE — 81001 URINALYSIS AUTO W/SCOPE: CPT

## 2024-12-03 PROCEDURE — 87186 SC STD MICRODIL/AGAR DIL: CPT

## 2024-12-04 ENCOUNTER — TELEPHONE (OUTPATIENT)
Dept: INTERNAL MEDICINE | Facility: CLINIC | Age: 49
End: 2024-12-04
Payer: COMMERCIAL

## 2024-12-04 DIAGNOSIS — R82.4 KETONURIA: Primary | ICD-10-CM

## 2024-12-04 DIAGNOSIS — R81 GLUCOSURIA: ICD-10-CM

## 2024-12-04 RX ORDER — NITROFURANTOIN 25; 75 MG/1; MG/1
100 CAPSULE ORAL 2 TIMES DAILY
Qty: 10 CAPSULE | Refills: 0 | Status: SHIPPED | OUTPATIENT
Start: 2024-12-04 | End: 2024-12-09

## 2024-12-04 NOTE — TELEPHONE ENCOUNTER
Name: Britany Pyle    Relationship: Self    Best Callback Number: [phone]    HUB PROVIDED THE RELAY MESSAGE FROM THE OFFICE   PATIENT VOICED UNDERSTANDING AND HAS NO FURTHER QUESTIONS AT THIS TIME    ADDITIONAL INFORMATION:

## 2024-12-04 NOTE — TELEPHONE ENCOUNTER
"Attempted to contact  patient. Left message to call the office back.    Relay   HUB ok to read/advise  \"Positive UTI sent in antibiotics. \"        "

## 2024-12-05 LAB — BACTERIA SPEC AEROBE CULT: ABNORMAL

## 2024-12-06 ENCOUNTER — TELEPHONE (OUTPATIENT)
Dept: INTERNAL MEDICINE | Facility: CLINIC | Age: 49
End: 2024-12-06
Payer: COMMERCIAL

## 2024-12-06 RX ORDER — FLUCONAZOLE 150 MG/1
150 TABLET ORAL ONCE
Qty: 2 TABLET | Refills: 0 | Status: SHIPPED | OUTPATIENT
Start: 2024-12-06 | End: 2024-12-06

## 2024-12-06 NOTE — TELEPHONE ENCOUNTER
Caller: Lashanda Britany M    Relationship: Self    Best call back number: [phone]     What medication are you requesting: DIFLUCAN    What are your current symptoms: PATIENT FREQUENTLY GETS YEAST INFECTIONS WHEN TAKING ANTIBIOTICS. CURRENTLY ON MACROBID FOR URINARY TRACT INFECTION    How long have you been experiencing symptoms: NO SYMPTOMS YET    Have you had these symptoms before:    [x] Yes  [] No    Have you been treated for these symptoms before:   [x] Yes  [] No    If a prescription is needed, what is your preferred pharmacy and phone number: [id number] - [place], [place] - [address] AT [facility] [phone] [facility] [phone]      Additional notes: PATIENT REQUESTING DIFLUCAN FOR POTENTIAL YEAST INFECTION FROM TAKING ANTIBIOTIC. PLEASE ADVISE.

## 2025-02-03 ENCOUNTER — LAB (OUTPATIENT)
Facility: HOSPITAL | Age: 50
End: 2025-02-03
Payer: COMMERCIAL

## 2025-02-03 ENCOUNTER — TELEPHONE (OUTPATIENT)
Dept: INTERNAL MEDICINE | Facility: CLINIC | Age: 50
End: 2025-02-03
Payer: COMMERCIAL

## 2025-02-03 DIAGNOSIS — R82.90 ABNORMAL URINE: ICD-10-CM

## 2025-02-03 DIAGNOSIS — M54.50 ACUTE BILATERAL LOW BACK PAIN, UNSPECIFIED WHETHER SCIATICA PRESENT: ICD-10-CM

## 2025-02-03 DIAGNOSIS — R82.90 ABNORMAL URINE: Primary | ICD-10-CM

## 2025-02-03 DIAGNOSIS — R81 GLUCOSURIA: ICD-10-CM

## 2025-02-03 DIAGNOSIS — R82.4 KETONURIA: ICD-10-CM

## 2025-02-03 LAB
BACTERIA UR QL AUTO: ABNORMAL /HPF
BILIRUB UR QL STRIP: NEGATIVE
CLARITY UR: ABNORMAL
COD CRY URNS QL: ABNORMAL /HPF
COLOR UR: YELLOW
GLUCOSE UR STRIP-MCNC: NEGATIVE MG/DL
HGB UR QL STRIP.AUTO: NEGATIVE
HYALINE CASTS UR QL AUTO: ABNORMAL /LPF
KETONES UR QL STRIP: ABNORMAL
LEUKOCYTE ESTERASE UR QL STRIP.AUTO: ABNORMAL
NITRITE UR QL STRIP: POSITIVE
PH UR STRIP.AUTO: 5.5 [PH] (ref 5–8)
PROT UR QL STRIP: NEGATIVE
RBC # UR STRIP: ABNORMAL /HPF
REF LAB TEST METHOD: ABNORMAL
SP GR UR STRIP: >1.03 (ref 1–1.03)
SQUAMOUS #/AREA URNS HPF: ABNORMAL /HPF
UROBILINOGEN UR QL STRIP: ABNORMAL
WBC # UR STRIP: ABNORMAL /HPF

## 2025-02-03 PROCEDURE — 81001 URINALYSIS AUTO W/SCOPE: CPT

## 2025-02-03 PROCEDURE — 87086 URINE CULTURE/COLONY COUNT: CPT

## 2025-02-03 PROCEDURE — 87186 SC STD MICRODIL/AGAR DIL: CPT

## 2025-02-03 PROCEDURE — 87088 URINE BACTERIA CULTURE: CPT

## 2025-02-03 NOTE — TELEPHONE ENCOUNTER
Caller: Britany Pyle    Relationship: Self    Best call back number:   Telephone Information:   Mobile [phone]         What orders are you requesting (i.e. lab or imaging): LABS     In what timeframe would the patient need to come in: ASAP     Where will you receive your lab/imaging services:      Additional notes:        THE PATIENT SAID SHE HAS BEEN HAVING LOW BACK PAIN AND CLOUDY URINE FOR A COUPLE OF WEEKS. SHE SAID SHE HAS BEEN SEEING THE CHIROPRACTOR AND IT IS NOT GETTING BETTER

## 2025-02-04 ENCOUNTER — TELEPHONE (OUTPATIENT)
Dept: INTERNAL MEDICINE | Facility: CLINIC | Age: 50
End: 2025-02-04
Payer: COMMERCIAL

## 2025-02-04 RX ORDER — NITROFURANTOIN 25; 75 MG/1; MG/1
100 CAPSULE ORAL 2 TIMES DAILY
Qty: 10 CAPSULE | Refills: 0 | Status: SHIPPED | OUTPATIENT
Start: 2025-02-04 | End: 2025-02-09

## 2025-02-05 LAB — BACTERIA SPEC AEROBE CULT: ABNORMAL

## 2025-02-10 NOTE — PROGRESS NOTES
Chief Complaint  Anxiety, Depression, and Hypertension (Patient reports she has lost some weight and would like to discuss discontinuing her blood pressure medication. )    Subjective          Britany Pyle presents to [facility]  History of Present Illness    History of Present Illness    Patient presents today for follow up on anxiety, depression, and hypertension. She states that she has started going to the gym and eating better and has lost about 14 lbs since December. She was taking amlodipine 5mg daily but stopped taking it about a week ago and has been keeping a log of her bp readings at home. SBP's less than 120, diastolic readings less than 80 consistently. She would like to stay off her medication.    Patient also reports that she was diagnosed with a UTI last week and finished her antibiotic about 5 days ago. Her only symptom was low back pain. She states that she is still having lower back pain, in the middle of her back, but she has also been seeing a chiropractor for low back pain since she fell on ice and landed on her tailbone a couple months ago.        Current Outpatient Medications   Medication Instructions    apixaban (ELIQUIS) 5 mg, Oral, 2 Times Daily    azelastine (ASTELIN) 0.1 % nasal spray 2 sprays, 2 Times Daily    busPIRone (BUSPAR) 5 mg, Oral, 3 Times Daily    ELDERBERRY PO 1 each, Daily    fluticasone (Flonase) 50 MCG/ACT nasal spray 2 sprays, Nasal, Daily    multivitamin (THERAGRAN) tablet tablet 1 tablet, Daily    omeprazole (PRILOSEC) 40 mg, Oral, Daily    rimegepant sulfate ODT (NURTEC-ODT) 75 mg, Oral, Every 48 Hours PRN    tamoxifen (NOLVADEX) 20 mg, Daily       The following portions of the patient's history were reviewed and updated as appropriate: allergies, current medications, past family history, past medical history, past social history, past surgical history, and problem list.    Objective   Vital Signs:   /76 (BP  "Location: Left arm, Patient Position: Sitting, Cuff Size: Adult)   Pulse 105   Ht 172.7 cm (68\")   Wt 79.8 kg (176 lb)   SpO2 95%   BMI 26.76 kg/m²     BP Readings from Last 3 Encounters:   02/14/25 123/76   11/15/24 137/96   08/15/24 135/92     Wt Readings from Last 3 Encounters:   02/14/25 79.8 kg (176 lb)   11/15/24 81.9 kg (180 lb 9.6 oz)   08/15/24 79.4 kg (175 lb)           Physical Exam     Appearance: No acute distress, well-nourished  Head: normocephalic, atraumatic  Eyes: extraocular movements intact, no scleral icterus, no conjunctival injection  Ears, Nose, and Throat: external ears normal, nares patent, moist mucous membranes  Cardiovascular: regular rate and rhythm. no murmurs, rubs, or gallops. no edema  Respiratory: breathing comfortably, symmetric chest rise, clear to auscultation bilaterally. No wheezes, rales, or rhonchi.  Neuro: alert and oriented to time, place, and person. Normal gait  Psych: normal mood and affect     Physical Exam        Result Review :   The following data was reviewed by: ROYCE Solis on 02/14/2025:  Common labs          5/21/2024    13:27 11/15/2024    12:08   Common Labs   Glucose 93  79    BUN 9  9    Creatinine 0.88  0.67    Sodium 140  139    Potassium 4.2  4.0    Chloride 102  104    Calcium 9.1  9.3    Albumin 4.3  4.1    Total Bilirubin 0.5  0.4    Alkaline Phosphatase 78  66    AST (SGOT) 16  16    ALT (SGPT) 9  13    WBC 6.74  7.04    Hemoglobin 13.3  13.6    Hematocrit 40.4  39.4    Platelets 281  277    Total Cholesterol 172  177    Triglycerides 147  238    HDL Cholesterol 43  39    LDL Cholesterol  103  97        Results           Lab Results   Component Value Date    COVID19 NOT DETECTED 04/28/2020    POCPREGUR Negative 01/23/2023    BILIRUBINUR Negative 02/03/2025            Assessment and Plan    Diagnoses and all orders for this visit:    1. Primary hypertension (Primary)  -     TSH Rfx On Abnormal To Free T4  -     Comprehensive " Metabolic Panel  -     CBC & Differential    2. ABEL (generalized anxiety disorder)  Comments:  well controlled  Orders:  -     TSH Rfx On Abnormal To Free T4  -     Comprehensive Metabolic Panel  -     CBC & Differential    3. Mild episode of recurrent major depressive disorder  Comments:  well controlled  Orders:  -     TSH Rfx On Abnormal To Free T4  -     Comprehensive Metabolic Panel  -     CBC & Differential    4. Screening for lipid disorders  -     Lipid Panel    5. Acute bilateral back pain, unspecified back location  Comments:  lumbar strain, exercises, ibuprofen, heat  Orders:  -     Urine Culture - Urine, Urine, Clean Catch    6. Chronic deep vein thrombosis (DVT) of popliteal vein of left lower extremity  Comments:  cont Eliquis      - home BP readings are normotensive. Will stop amlodipine today. Ambulatory BP log and will f/u in 3 months to be sure BP is staying low.     - will recheck urine today although I do not feel this is related to her back pain  Assessment & Plan      Medications Discontinued During This Encounter   Medication Reason    amLODIPine (NORVASC) 5 MG tablet Historical Med - Therapy completed          Follow Up   No follow-ups on file.  Patient was given instructions and counseling regarding her condition or for health maintenance advice. Please see specific information pulled into the AVS if appropriate.       ROYCE Solis  02/14/25  12:38 EST      Patient or patient representative verbalized consent for the use of Ambient Listening during the visit with  ROYCE Solis for chart documentation. 2/14/2025  12:37 EST

## 2025-02-14 ENCOUNTER — OFFICE VISIT (OUTPATIENT)
Dept: INTERNAL MEDICINE | Facility: CLINIC | Age: 50
End: 2025-02-14
Payer: COMMERCIAL

## 2025-02-14 VITALS
BODY MASS INDEX: 26.67 KG/M2 | DIASTOLIC BLOOD PRESSURE: 76 MMHG | OXYGEN SATURATION: 95 % | WEIGHT: 176 LBS | HEART RATE: 105 BPM | HEIGHT: 68 IN | SYSTOLIC BLOOD PRESSURE: 123 MMHG

## 2025-02-14 DIAGNOSIS — Z13.220 SCREENING FOR LIPID DISORDERS: ICD-10-CM

## 2025-02-14 DIAGNOSIS — F41.1 GAD (GENERALIZED ANXIETY DISORDER): Chronic | ICD-10-CM

## 2025-02-14 DIAGNOSIS — I82.532 CHRONIC DEEP VEIN THROMBOSIS (DVT) OF POPLITEAL VEIN OF LEFT LOWER EXTREMITY: Chronic | ICD-10-CM

## 2025-02-14 DIAGNOSIS — F33.0 MILD EPISODE OF RECURRENT MAJOR DEPRESSIVE DISORDER: Chronic | ICD-10-CM

## 2025-02-14 DIAGNOSIS — M54.9 ACUTE BILATERAL BACK PAIN, UNSPECIFIED BACK LOCATION: ICD-10-CM

## 2025-02-14 DIAGNOSIS — I10 PRIMARY HYPERTENSION: Primary | ICD-10-CM

## 2025-02-14 LAB
ALBUMIN SERPL-MCNC: 3.8 G/DL (ref 3.5–5.2)
ALBUMIN/GLOB SERPL: 1 G/DL
ALP SERPL-CCNC: 58 U/L (ref 39–117)
ALT SERPL W P-5'-P-CCNC: 14 U/L (ref 1–33)
ANION GAP SERPL CALCULATED.3IONS-SCNC: 10.9 MMOL/L (ref 5–15)
AST SERPL-CCNC: 16 U/L (ref 1–32)
BASOPHILS # BLD AUTO: 0.07 10*3/MM3 (ref 0–0.2)
BASOPHILS NFR BLD AUTO: 0.7 % (ref 0–1.5)
BILIRUB SERPL-MCNC: 0.4 MG/DL (ref 0–1.2)
BUN SERPL-MCNC: 16 MG/DL (ref 6–20)
BUN/CREAT SERPL: 14.8 (ref 7–25)
CALCIUM SPEC-SCNC: 10.1 MG/DL (ref 8.6–10.5)
CHLORIDE SERPL-SCNC: 104 MMOL/L (ref 98–107)
CHOLEST SERPL-MCNC: 171 MG/DL (ref 0–200)
CO2 SERPL-SCNC: 25.1 MMOL/L (ref 22–29)
CREAT SERPL-MCNC: 1.08 MG/DL (ref 0.57–1)
DEPRECATED RDW RBC AUTO: 37.7 FL (ref 37–54)
EGFRCR SERPLBLD CKD-EPI 2021: 63.9 ML/MIN/1.73
EOSINOPHIL # BLD AUTO: 0.05 10*3/MM3 (ref 0–0.4)
EOSINOPHIL NFR BLD AUTO: 0.5 % (ref 0.3–6.2)
ERYTHROCYTE [DISTWIDTH] IN BLOOD BY AUTOMATED COUNT: 11.4 % (ref 12.3–15.4)
GLOBULIN UR ELPH-MCNC: 4 GM/DL
GLUCOSE SERPL-MCNC: 89 MG/DL (ref 65–99)
HCT VFR BLD AUTO: 39.1 % (ref 34–46.6)
HDLC SERPL-MCNC: 41 MG/DL (ref 40–60)
HGB BLD-MCNC: 13.4 G/DL (ref 12–15.9)
IMM GRANULOCYTES # BLD AUTO: 0.03 10*3/MM3 (ref 0–0.05)
IMM GRANULOCYTES NFR BLD AUTO: 0.3 % (ref 0–0.5)
LDLC SERPL CALC-MCNC: 94 MG/DL (ref 0–100)
LDLC/HDLC SERPL: 2.14 {RATIO}
LYMPHOCYTES # BLD AUTO: 2.98 10*3/MM3 (ref 0.7–3.1)
LYMPHOCYTES NFR BLD AUTO: 31.4 % (ref 19.6–45.3)
MCH RBC QN AUTO: 31.3 PG (ref 26.6–33)
MCHC RBC AUTO-ENTMCNC: 34.3 G/DL (ref 31.5–35.7)
MCV RBC AUTO: 91.4 FL (ref 79–97)
MONOCYTES # BLD AUTO: 0.79 10*3/MM3 (ref 0.1–0.9)
MONOCYTES NFR BLD AUTO: 8.3 % (ref 5–12)
NEUTROPHILS NFR BLD AUTO: 5.58 10*3/MM3 (ref 1.7–7)
NEUTROPHILS NFR BLD AUTO: 58.8 % (ref 42.7–76)
NRBC BLD AUTO-RTO: 0 /100 WBC (ref 0–0.2)
PLATELET # BLD AUTO: 291 10*3/MM3 (ref 140–450)
PMV BLD AUTO: 10.4 FL (ref 6–12)
POTASSIUM SERPL-SCNC: 3.9 MMOL/L (ref 3.5–5.2)
PROT SERPL-MCNC: 7.8 G/DL (ref 6–8.5)
RBC # BLD AUTO: 4.28 10*6/MM3 (ref 3.77–5.28)
SODIUM SERPL-SCNC: 140 MMOL/L (ref 136–145)
TRIGL SERPL-MCNC: 212 MG/DL (ref 0–150)
TSH SERPL DL<=0.05 MIU/L-ACNC: 2.26 UIU/ML (ref 0.27–4.2)
VLDLC SERPL-MCNC: 36 MG/DL (ref 5–40)
WBC NRBC COR # BLD AUTO: 9.5 10*3/MM3 (ref 3.4–10.8)

## 2025-02-14 PROCEDURE — 80061 LIPID PANEL: CPT | Performed by: NURSE PRACTITIONER

## 2025-02-14 PROCEDURE — 84443 ASSAY THYROID STIM HORMONE: CPT | Performed by: NURSE PRACTITIONER

## 2025-02-14 PROCEDURE — 1126F AMNT PAIN NOTED NONE PRSNT: CPT | Performed by: NURSE PRACTITIONER

## 2025-02-14 PROCEDURE — 80053 COMPREHEN METABOLIC PANEL: CPT | Performed by: NURSE PRACTITIONER

## 2025-02-14 PROCEDURE — 3078F DIAST BP <80 MM HG: CPT | Performed by: NURSE PRACTITIONER

## 2025-02-14 PROCEDURE — 85025 COMPLETE CBC W/AUTO DIFF WBC: CPT | Performed by: NURSE PRACTITIONER

## 2025-02-14 PROCEDURE — 99214 OFFICE O/P EST MOD 30 MIN: CPT | Performed by: NURSE PRACTITIONER

## 2025-02-14 PROCEDURE — 87086 URINE CULTURE/COLONY COUNT: CPT | Performed by: NURSE PRACTITIONER

## 2025-02-14 PROCEDURE — 3074F SYST BP LT 130 MM HG: CPT | Performed by: NURSE PRACTITIONER

## 2025-02-16 LAB — BACTERIA SPEC AEROBE CULT: NO GROWTH

## 2025-02-24 ENCOUNTER — TELEPHONE (OUTPATIENT)
Dept: INTERNAL MEDICINE | Facility: CLINIC | Age: 50
End: 2025-02-24
Payer: COMMERCIAL

## 2025-02-24 NOTE — TELEPHONE ENCOUNTER
Caller: Britany Pyle    Relationship: Self    Best call back number:   Telephone Information:   Mobile [phone]        What medication are you requesting: TAMIFLU    What are your current symptoms: CONGESTION     How long have you been experiencing symptoms:  ONE DAY     If a prescription is needed, what is your preferred pharmacy and phone number: [id number] - [place], [place] - [address] AT [facility] [phone] [facility] [phone]      Additional notes:PATIENT WAS IN CONTACT WITH SOMEONE WITH THE FLU

## 2025-02-25 RX ORDER — OSELTAMIVIR PHOSPHATE 75 MG/1
75 CAPSULE ORAL 2 TIMES DAILY
Qty: 14 CAPSULE | Refills: 0 | Status: SHIPPED | OUTPATIENT
Start: 2025-02-25 | End: 2025-03-04

## 2025-02-25 NOTE — TELEPHONE ENCOUNTER
Patient called to get an update on this to see if she could get TAMIFLU sent in for her. She said that she stayed with her boyfriend all weekend and he has the flu and she just wants to try and beat it, she is only sneezing right now, no other symptoms.

## 2025-05-27 ENCOUNTER — TELEPHONE (OUTPATIENT)
Dept: INTERNAL MEDICINE | Facility: CLINIC | Age: 50
End: 2025-05-27
Payer: COMMERCIAL

## 2025-05-27 DIAGNOSIS — R39.89 SUSPECTED UTI: Primary | ICD-10-CM

## 2025-05-27 NOTE — TELEPHONE ENCOUNTER
Caller: Britany Pyle    Relationship: Self    Best call back number: [phone]     What orders are you requesting (i.e. lab or imaging): URINALYSIS FOR UTI    In what timeframe would the patient need to come in: ASAP    Where will you receive your lab/imaging services: HOSPITAL    Additional notes: PLEASE CALL AND ADVISE

## 2025-05-27 NOTE — TELEPHONE ENCOUNTER
Tried to return patient's call, had to leave VM to return my call.   HUB OKAY TO RELAY:  Is patient just wanting to come in office to leave a urine sample? If so, what symptoms is she having?   She can be scheduled for a nurse visit for the urine if this is what she is needing,  HUB CAN TRANSFER CALL IF NEEDED FOR SCHEDULING

## 2025-05-29 ENCOUNTER — LAB (OUTPATIENT)
Facility: HOSPITAL | Age: 50
End: 2025-05-29
Payer: COMMERCIAL

## 2025-05-29 ENCOUNTER — RESULTS FOLLOW-UP (OUTPATIENT)
Dept: INTERNAL MEDICINE | Facility: CLINIC | Age: 50
End: 2025-05-29
Payer: COMMERCIAL

## 2025-05-29 LAB
BACTERIA UR QL AUTO: ABNORMAL /HPF
BILIRUB UR QL STRIP: NEGATIVE
CLARITY UR: CLEAR
COLOR UR: YELLOW
GLUCOSE UR STRIP-MCNC: NEGATIVE MG/DL
HGB UR QL STRIP.AUTO: NEGATIVE
HYALINE CASTS UR QL AUTO: ABNORMAL /LPF
KETONES UR QL STRIP: ABNORMAL
LEUKOCYTE ESTERASE UR QL STRIP.AUTO: ABNORMAL
NITRITE UR QL STRIP: POSITIVE
PH UR STRIP.AUTO: 5.5 [PH] (ref 5–8)
PROT UR QL STRIP: NEGATIVE
RBC # UR STRIP: ABNORMAL /HPF
REF LAB TEST METHOD: ABNORMAL
SP GR UR STRIP: 1.03 (ref 1–1.03)
SQUAMOUS #/AREA URNS HPF: ABNORMAL /HPF
UROBILINOGEN UR QL STRIP: ABNORMAL
WBC # UR STRIP: ABNORMAL /HPF

## 2025-05-29 PROCEDURE — 87088 URINE BACTERIA CULTURE: CPT | Performed by: NURSE PRACTITIONER

## 2025-05-29 PROCEDURE — 81001 URINALYSIS AUTO W/SCOPE: CPT | Performed by: NURSE PRACTITIONER

## 2025-05-29 PROCEDURE — 87186 SC STD MICRODIL/AGAR DIL: CPT | Performed by: NURSE PRACTITIONER

## 2025-05-29 PROCEDURE — 87086 URINE CULTURE/COLONY COUNT: CPT | Performed by: NURSE PRACTITIONER

## 2025-05-29 RX ORDER — NITROFURANTOIN 25; 75 MG/1; MG/1
100 CAPSULE ORAL 2 TIMES DAILY
Qty: 14 CAPSULE | Refills: 0 | Status: SHIPPED | OUTPATIENT
Start: 2025-05-29 | End: 2025-06-05

## 2025-05-29 NOTE — TELEPHONE ENCOUNTER
Spoke with patient. Verified .     Made aware of results --  + UTI - abx sent to pharmacy     Patient verbalized understanding

## 2025-05-30 DIAGNOSIS — I82.532 CHRONIC DEEP VEIN THROMBOSIS (DVT) OF POPLITEAL VEIN OF LEFT LOWER EXTREMITY: ICD-10-CM

## 2025-05-30 DIAGNOSIS — I82.512 CHRONIC DEEP VEIN THROMBOSIS (DVT) OF FEMORAL VEIN OF LEFT LOWER EXTREMITY: ICD-10-CM

## 2025-05-30 DIAGNOSIS — G43.809 OTHER MIGRAINE WITHOUT STATUS MIGRAINOSUS, NOT INTRACTABLE: ICD-10-CM

## 2025-05-31 LAB — BACTERIA SPEC AEROBE CULT: ABNORMAL

## 2025-06-03 ENCOUNTER — PRIOR AUTHORIZATION (OUTPATIENT)
Dept: INTERNAL MEDICINE | Facility: CLINIC | Age: 50
End: 2025-06-03
Payer: COMMERCIAL

## 2025-06-03 NOTE — TELEPHONE ENCOUNTER
PA for:  rimegepant sulfate ODT (NURTEC-ODT) 75 MG disintegrating tablet (05/30/2025)     Sent to plan via CoverMyMeds  (Key: [id number])    Waiting for insurance to send determination

## 2025-06-25 DIAGNOSIS — I82.512 CHRONIC DEEP VEIN THROMBOSIS (DVT) OF FEMORAL VEIN OF LEFT LOWER EXTREMITY: ICD-10-CM

## 2025-06-25 DIAGNOSIS — I82.532 CHRONIC DEEP VEIN THROMBOSIS (DVT) OF POPLITEAL VEIN OF LEFT LOWER EXTREMITY: ICD-10-CM

## 2025-06-26 RX ORDER — APIXABAN 5 MG/1
5 TABLET, FILM COATED ORAL 2 TIMES DAILY
Qty: 360 TABLET | Refills: 1 | OUTPATIENT
Start: 2025-06-26

## 2025-06-27 NOTE — TELEPHONE ENCOUNTER
Caller: Britany Pyle    Relationship: Self    Best call back number: [phone]    Which medication are you concerned about: apixaban (Eliquis) 5 MG tablet tablet     What are your concerns: PATIENT STATED MEDICATION IS NEEDING PA. ALSO PATIENT INSURANCE WILL RUN ON 6-30-25. PATIENT IS ASKING PROVIDER SYEDA TO REFILL MEDICATION BEFORE INSURANCE RUNS OUT. PATIENT DOES HAVE A 30 DAY SUPPLY BUT TRYING TO GET REFILL BEFORE 6-30-25. PATIENT WOULD LIKE A CALL BACK REGARDING MEDICATION REFILL.